# Patient Record
Sex: FEMALE | Race: BLACK OR AFRICAN AMERICAN | URBAN - METROPOLITAN AREA
[De-identification: names, ages, dates, MRNs, and addresses within clinical notes are randomized per-mention and may not be internally consistent; named-entity substitution may affect disease eponyms.]

---

## 2019-08-22 ENCOUNTER — OFFICE VISIT (OUTPATIENT)
Dept: FAMILY MEDICINE CLINIC | Age: 19
End: 2019-08-22
Payer: COMMERCIAL

## 2019-08-22 VITALS
SYSTOLIC BLOOD PRESSURE: 124 MMHG | HEART RATE: 84 BPM | OXYGEN SATURATION: 99 % | DIASTOLIC BLOOD PRESSURE: 82 MMHG | TEMPERATURE: 97.8 F | HEIGHT: 66 IN | RESPIRATION RATE: 12 BRPM | WEIGHT: 178 LBS | BODY MASS INDEX: 28.61 KG/M2

## 2019-08-22 DIAGNOSIS — R31.9 HEMATURIA, UNSPECIFIED TYPE: Primary | ICD-10-CM

## 2019-08-22 DIAGNOSIS — R39.9 UTI SYMPTOMS: ICD-10-CM

## 2019-08-22 LAB
BILIRUBIN, POC: ABNORMAL
BLOOD URINE, POC: ABNORMAL
CLARITY, POC: ABNORMAL
COLOR, POC: ABNORMAL
GLUCOSE URINE, POC: ABNORMAL
KETONES, POC: ABNORMAL
LEUKOCYTE EST, POC: ABNORMAL
NITRITE, POC: ABNORMAL
PH, POC: 7
PROTEIN, POC: ABNORMAL
SPECIFIC GRAVITY, POC: 0.01
UROBILINOGEN, POC: ABNORMAL

## 2019-08-22 PROCEDURE — 1036F TOBACCO NON-USER: CPT | Performed by: NURSE PRACTITIONER

## 2019-08-22 PROCEDURE — 81002 URINALYSIS NONAUTO W/O SCOPE: CPT | Performed by: NURSE PRACTITIONER

## 2019-08-22 PROCEDURE — G8419 CALC BMI OUT NRM PARAM NOF/U: HCPCS | Performed by: NURSE PRACTITIONER

## 2019-08-22 PROCEDURE — 99213 OFFICE O/P EST LOW 20 MIN: CPT | Performed by: NURSE PRACTITIONER

## 2019-08-22 PROCEDURE — G8427 DOCREV CUR MEDS BY ELIG CLIN: HCPCS | Performed by: NURSE PRACTITIONER

## 2019-08-22 RX ORDER — ALBUTEROL SULFATE 90 UG/1
AEROSOL, METERED RESPIRATORY (INHALATION)
COMMUNITY
Start: 2011-10-27

## 2019-08-22 RX ORDER — SULFAMETHOXAZOLE AND TRIMETHOPRIM 800; 160 MG/1; MG/1
1 TABLET ORAL 2 TIMES DAILY
Qty: 14 TABLET | Refills: 0 | Status: SHIPPED | OUTPATIENT
Start: 2019-08-22 | End: 2019-08-29

## 2019-08-22 ASSESSMENT — PATIENT HEALTH QUESTIONNAIRE - PHQ9
1. LITTLE INTEREST OR PLEASURE IN DOING THINGS: 0
SUM OF ALL RESPONSES TO PHQ QUESTIONS 1-9: 0
SUM OF ALL RESPONSES TO PHQ9 QUESTIONS 1 & 2: 0
SUM OF ALL RESPONSES TO PHQ QUESTIONS 1-9: 0
2. FEELING DOWN, DEPRESSED OR HOPELESS: 0

## 2019-08-23 DIAGNOSIS — R31.9 HEMATURIA, UNSPECIFIED TYPE: ICD-10-CM

## 2019-08-23 ASSESSMENT — ENCOUNTER SYMPTOMS
NAUSEA: 0
BACK PAIN: 0
RESPIRATORY NEGATIVE: 1
VOMITING: 0

## 2019-08-25 LAB — URINE CULTURE, ROUTINE: NORMAL

## 2020-09-09 ENCOUNTER — HOSPITAL ENCOUNTER (OUTPATIENT)
Age: 20
Setting detail: SPECIMEN
Discharge: HOME | End: 2020-09-09
Payer: COMMERCIAL

## 2020-09-09 ENCOUNTER — OFFICE VISIT (OUTPATIENT)
Dept: FAMILY MEDICINE CLINIC | Age: 20
End: 2020-09-09
Payer: COMMERCIAL

## 2020-09-09 VITALS
SYSTOLIC BLOOD PRESSURE: 114 MMHG | DIASTOLIC BLOOD PRESSURE: 78 MMHG | HEIGHT: 66 IN | WEIGHT: 180 LBS | HEART RATE: 105 BPM | TEMPERATURE: 98 F | BODY MASS INDEX: 28.93 KG/M2 | OXYGEN SATURATION: 98 % | RESPIRATION RATE: 12 BRPM

## 2020-09-09 LAB
BILIRUBIN, POC: NORMAL
BLOOD URINE, POC: NORMAL
CLARITY, POC: CLEAR
COLOR, POC: YELLOW
GLUCOSE URINE, POC: NORMAL
HCG, URINE, POC: NEGATIVE
KETONES, POC: NORMAL
LEUKOCYTE EST, POC: NORMAL
Lab: NORMAL
NEGATIVE QC PASS/FAIL: NORMAL
NITRITE, POC: NORMAL
PH, POC: 6.5
POSITIVE QC PASS/FAIL: NORMAL
PROTEIN, POC: NORMAL
SPECIFIC GRAVITY, POC: 1
UROBILINOGEN, POC: NORMAL

## 2020-09-09 PROCEDURE — G8419 CALC BMI OUT NRM PARAM NOF/U: HCPCS | Performed by: PHYSICIAN ASSISTANT

## 2020-09-09 PROCEDURE — 87210 SMEAR WET MOUNT SALINE/INK: CPT

## 2020-09-09 PROCEDURE — 1036F TOBACCO NON-USER: CPT | Performed by: PHYSICIAN ASSISTANT

## 2020-09-09 PROCEDURE — 99213 OFFICE O/P EST LOW 20 MIN: CPT | Performed by: PHYSICIAN ASSISTANT

## 2020-09-09 PROCEDURE — 87591 N.GONORRHOEAE DNA AMP PROB: CPT

## 2020-09-09 PROCEDURE — 87808 TRICHOMONAS ASSAY W/OPTIC: CPT

## 2020-09-09 PROCEDURE — 87491 CHLMYD TRACH DNA AMP PROBE: CPT

## 2020-09-09 PROCEDURE — G8427 DOCREV CUR MEDS BY ELIG CLIN: HCPCS | Performed by: PHYSICIAN ASSISTANT

## 2020-09-09 PROCEDURE — 81025 URINE PREGNANCY TEST: CPT | Performed by: PHYSICIAN ASSISTANT

## 2020-09-09 PROCEDURE — 81002 URINALYSIS NONAUTO W/O SCOPE: CPT | Performed by: PHYSICIAN ASSISTANT

## 2020-09-09 ASSESSMENT — PATIENT HEALTH QUESTIONNAIRE - PHQ9
SUM OF ALL RESPONSES TO PHQ QUESTIONS 1-9: 0
SUM OF ALL RESPONSES TO PHQ9 QUESTIONS 1 & 2: 0
2. FEELING DOWN, DEPRESSED OR HOPELESS: 0
SUM OF ALL RESPONSES TO PHQ QUESTIONS 1-9: 0
1. LITTLE INTEREST OR PLEASURE IN DOING THINGS: 0

## 2020-09-09 ASSESSMENT — ENCOUNTER SYMPTOMS
NAUSEA: 0
DIARRHEA: 0
BACK PAIN: 0

## 2020-09-09 NOTE — PATIENT INSTRUCTIONS
sex. Use them from the beginning to the end of sexual contact. · Be sure to tell your sexual partner or partners that you have HPV. Even if you do not have symptoms, you can still pass HPV to others. · Having one sex partner (who does not have STIs and does not have sex with anyone else) is a good way to avoid STIs. When should you call for help? Watch closely for changes in your health, and be sure to contact your doctor if:  · You have vaginal pain during or after sex. · You have vaginal bleeding when you are not in your menstrual period. Where can you learn more? Go to [email or web address].[email or web address]. org and sign in to your [employer] account. Enter F690 in the [employer] box to learn more about \"Human Papillomavirus (HPV): Care Instructions. \"     If you do not have an account, please click on the \"Sign Up Now\" link. Current as of: February 26, 2020               Content Version: 12.5  © 2006-2020 [employer]. Care instructions adapted under license by [facility] [employer] [facility] ([facility]). If you have questions about a medical condition or this instruction, always ask your healthcare professional. [place] [address] any warranty or liability for your use of this information. Patient Education        Genital Warts: Care Instructions  Your Care Instructions  Genital warts are caused by a virus called the human papillomavirus (HPV). They are considered a sexually transmitted infection (STI) because the virus can be spread by sexual contact. The warts often look like small, fleshy bumps or flat, white patches. They can be anywhere in the genital area. You can also be infected with HPV yet not have visible warts. Genital warts often go away on their own without treatment. Some people decide to treat them because of the symptoms or the warts' appearance. Follow-up care is a key part of your treatment and safety.  Be sure to make and go to all appointments, and call your doctor if instruction, always ask your healthcare professional. [place] [address] any warranty or liability for your use of this information.

## 2020-09-09 NOTE — PROGRESS NOTES
Subjective     Elsa Aguayo 23 y.o. female presents 9/9/20 with   Chief Complaint   Patient presents with    Exposure to STD     C/o a lession in the vaginal area. Patient reports she had unprotected sex last month        HPI   Patient is 18yo F with no pertinent PMH who c/o new vaginal skin lesions x 1 week, unchanged. States she had unprotected sexual intercourse with a new male partner about 5 days before the lesions appeared but she does not know if the partner had any symptoms. The lesions are described as a painless cluster of small raised flesh-colored papules near the vaginal opening. She has an IUD for contraception but also took a Plan B the next day. She had a Gardasil vaccine in 2011 and 2017. States she also has a yellow vaginal discharge with slight odor. Denies vaginal bleeding, pain or itching. No treatment so far . Reviewed the following history:    Past Medical History:   Diagnosis Date    Allergic     Asthma     Seasonal allergies      Past Surgical History:   Procedure Laterality Date    ADENOIDECTOMY      PATENT DUCTUS ARTERIOUS LIGATION      TONSILLECTOMY       Family History   Problem Relation Age of Onset    Birth Defects Maternal Grandfather         Prostate cancer       Allergies   Allergen Reactions    Peanuts [Peanut Oil] Swelling    Banana      Throat gets itchy       Current Outpatient Medications   Medication Sig Dispense Refill    albuterol sulfate HFA (VENTOLIN HFA) 108 (90 Base) MCG/ACT inhaler Inhale into the lungs      beclomethasone (QVAR) 80 MCG/ACT inhaler Inhale into the lungs      misoprostol (CYTOTEC) 200 MCG tablet Take 1 tab evening before procedure. Take 1 tab morning of procedure. 2 tablet 0    APRI 0.15-30 MG-MCG per tablet take 1 (ONE) tablet daily  1    ketoconazole (NIZORAL) 2 % cream Apply topically daily for 14 days.  30 g 0    fluticasone (FLONASE) 50 MCG/ACT nasal spray by Nasal route      EPINEPHRINE, ANAPHYLAXIS THERAPY AGENTS, Inject into the muscle      Loratadine 10 MG CAPS Take 1 tablet by mouth daily. Current Facility-Administered Medications   Medication Dose Route Frequency Provider Last Rate Last Dose    levonorgestrel (KULDIP) IUD 13.5 mg 1 each  1 each Intrauterine Once Wyn Labor, APRN - CNP   1 each at 08/13/18 1500       Review of Systems   Constitutional: Negative for chills and fever. Gastrointestinal: Negative for diarrhea and nausea. Genitourinary: Positive for genital sores and vaginal discharge. Negative for dyspareunia, frequency, hematuria, menstrual problem, pelvic pain, vaginal bleeding and vaginal pain. Musculoskeletal: Negative for back pain. All other systems reviewed and are negative. Objective    Vitals:    09/09/20 1336   BP: 114/78   Site: Right Upper Arm   Position: Sitting   Cuff Size: Small Adult   Pulse: 105   Resp: 12   Temp: 98 °F (36.7 °C)   TempSrc: Temporal   SpO2: 98%   Weight: 180 lb (81.6 kg)   Height: 5' 6\" (1.676 m)       Physical Exam  Vitals signs and nursing note reviewed. Constitutional:       General: She is not in acute distress. Appearance: Normal appearance. HENT:      Mouth/Throat:      Pharynx: Oropharynx is clear. No oropharyngeal exudate or posterior oropharyngeal erythema. Eyes:      Conjunctiva/sclera: Conjunctivae normal.   Cardiovascular:      Rate and Rhythm: Normal rate and regular rhythm. Pulmonary:      Effort: Pulmonary effort is normal.      Breath sounds: Normal breath sounds. Genitourinary:      Lymphadenopathy:      Cervical: No cervical adenopathy. Neurological:      Mental Status: She is alert. Assessment and Plan      ICD-10-CM    1. Vaginal discharge  N89.8 POC Pregnancy Ur-Qual     C.trachomatis N.gonorrhoeae DNA, Urine     Wet Prep, Genital     POCT Urinalysis no Micro   2. Genital warts  A63.0    3.  Unprotected sexual intercourse  Z72.51 POC Pregnancy Ur-Qual     C.trachomatis N.gonorrhoeae DNA, Urine Orders Placed This Encounter   Procedures    C.trachomatis N.gonorrhoeae DNA, Urine     Standing Status:   Future     Standing Expiration Date:   9/9/2021    Wet Prep, Genital     Standing Status:   Future     Standing Expiration Date:   9/9/2021    POC Pregnancy Ur-Qual    POCT Urinalysis no Micro     Results for POC orders placed in visit on 09/09/20   POCT Urinalysis no Micro   Result Value Ref Range    Color, UA yellow     Clarity, UA clear     Glucose, UA POC neg     Bilirubin, UA neg     Ketones, UA neg     Spec Grav, UA 1.005     Blood, UA POC neg     pH, UA 6.5     Protein, UA POC neg     Urobilinogen, UA neg     Leukocytes, UA neg     Nitrite, UA neg      I will call with results. No orders of the defined types were placed in this encounter. Reviewed with the patient: current clinicalstatus, medications, activities and diet. Side effects, adverse effects of the medication prescribed today, as well as treatment plan and result expectations have been discussed with the patient who expressesunderstanding and desires to proceed. Close follow up to evaluate treatment results and for coordination of care. I have reviewed the patient's medical history in detail and updated the computerized patientrecord. Return if symptoms worsen or fail to improve, for follow up with PCP.     MARIA DOLORES Jacinto

## 2020-09-10 LAB
CLUE CELLS: NORMAL
TRICHOMONAS PREP: NORMAL
TRICHOMONAS VAGINALIS SCREEN: NEGATIVE
YEAST WET PREP: NORMAL

## 2020-09-15 LAB
C. TRACHOMATIS DNA ,URINE: NEGATIVE
N. GONORRHOEAE DNA, URINE: NEGATIVE

## 2020-09-16 ENCOUNTER — TELEPHONE (OUTPATIENT)
Dept: FAMILY MEDICINE CLINIC | Age: 20
End: 2020-09-16

## 2022-01-26 ENCOUNTER — HOSPITAL ENCOUNTER (OUTPATIENT)
Dept: CT IMAGING | Age: 22
Discharge: HOME | End: 2022-01-28
Payer: COMMERCIAL

## 2022-01-26 ENCOUNTER — OFFICE VISIT (OUTPATIENT)
Dept: FAMILY MEDICINE CLINIC | Age: 22
End: 2022-01-26
Payer: COMMERCIAL

## 2022-01-26 VITALS
BODY MASS INDEX: 23.3 KG/M2 | TEMPERATURE: 97.1 F | DIASTOLIC BLOOD PRESSURE: 78 MMHG | HEIGHT: 66 IN | SYSTOLIC BLOOD PRESSURE: 120 MMHG | WEIGHT: 145 LBS | OXYGEN SATURATION: 100 % | HEART RATE: 108 BPM | RESPIRATION RATE: 11 BRPM

## 2022-01-26 DIAGNOSIS — R59.1 LYMPHADENOPATHY: ICD-10-CM

## 2022-01-26 DIAGNOSIS — R59.1 LYMPHADENOPATHY: Primary | ICD-10-CM

## 2022-01-26 DIAGNOSIS — J02.9 ACUTE PHARYNGITIS, UNSPECIFIED ETIOLOGY: ICD-10-CM

## 2022-01-26 LAB
CONTROL: NORMAL
INFLUENZA A ANTIBODY: NORMAL
INFLUENZA B ANTIBODY: NORMAL
Lab: NORMAL
PERFORMING INSTRUMENT: NORMAL
PREGNANCY TEST URINE, POC: NEGATIVE
QC PASS/FAIL: NORMAL
S PYO AG THROAT QL: NORMAL
SARS-COV-2, POC: NORMAL

## 2022-01-26 PROCEDURE — G8484 FLU IMMUNIZE NO ADMIN: HCPCS | Performed by: PHYSICIAN ASSISTANT

## 2022-01-26 PROCEDURE — 87880 STREP A ASSAY W/OPTIC: CPT | Performed by: PHYSICIAN ASSISTANT

## 2022-01-26 PROCEDURE — 87804 INFLUENZA ASSAY W/OPTIC: CPT | Performed by: PHYSICIAN ASSISTANT

## 2022-01-26 PROCEDURE — 70491 CT SOFT TISSUE NECK W/DYE: CPT

## 2022-01-26 PROCEDURE — 99213 OFFICE O/P EST LOW 20 MIN: CPT | Performed by: PHYSICIAN ASSISTANT

## 2022-01-26 PROCEDURE — 87426 SARSCOV CORONAVIRUS AG IA: CPT | Performed by: PHYSICIAN ASSISTANT

## 2022-01-26 PROCEDURE — G8420 CALC BMI NORM PARAMETERS: HCPCS | Performed by: PHYSICIAN ASSISTANT

## 2022-01-26 PROCEDURE — G8427 DOCREV CUR MEDS BY ELIG CLIN: HCPCS | Performed by: PHYSICIAN ASSISTANT

## 2022-01-26 PROCEDURE — 1036F TOBACCO NON-USER: CPT | Performed by: PHYSICIAN ASSISTANT

## 2022-01-26 PROCEDURE — 81025 URINE PREGNANCY TEST: CPT | Performed by: PHYSICIAN ASSISTANT

## 2022-01-26 PROCEDURE — 86308 HETEROPHILE ANTIBODY SCREEN: CPT | Performed by: PHYSICIAN ASSISTANT

## 2022-01-26 PROCEDURE — 6360000004 HC RX CONTRAST MEDICATION: Performed by: PHYSICIAN ASSISTANT

## 2022-01-26 RX ORDER — AMOXICILLIN AND CLAVULANATE POTASSIUM 875; 125 MG/1; MG/1
1 TABLET, FILM COATED ORAL 2 TIMES DAILY
Qty: 20 TABLET | Refills: 0 | Status: SHIPPED | OUTPATIENT
Start: 2022-01-26 | End: 2022-02-05

## 2022-01-26 RX ADMIN — IOPAMIDOL 100 ML: 755 INJECTION, SOLUTION INTRAVENOUS at 18:02

## 2022-01-26 ASSESSMENT — ENCOUNTER SYMPTOMS
SORE THROAT: 1
DIARRHEA: 0
NAUSEA: 1
TROUBLE SWALLOWING: 1
VOMITING: 0

## 2022-01-26 NOTE — PROGRESS NOTES
Subjective:      Patient ID: Vinicio Bates is a 24 y.o. female who presents today for:  Chief Complaint   Patient presents with    Pharyngitis     pt states this started 2-3 days ago .  Other     pt has swollen lymp nodes . Began to have a sore throat about 2-3 days ago and noticed extreme, tender swelling to L side of her neck and worsening throat pain. Unsure whether she had a fever PTA but is tachycardic and afebrile in clinic today. Pt has had increasingly severe HAs,+ nausea no vomiting  No subjective FC, diarrhea, coughing, SOB, wheezing. Pt is sexually active, denies any other lymphadenopathy. Past Medical History:   Diagnosis Date    Allergic     Asthma     High cholesterol     Seasonal allergies      Past Surgical History:   Procedure Laterality Date    ADENOIDECTOMY      PATENT DUCTUS ARTERIOUS LIGATION      TONSILLECTOMY       Family History   Problem Relation Age of Onset    Birth Defects Maternal Grandfather         Prostate cancer     Social History     Socioeconomic History    Marital status: Single     Spouse name: Not on file    Number of children: Not on file    Years of education: Not on file    Highest education level: Not on file   Occupational History    Not on file   Tobacco Use    Smoking status: Never Smoker    Smokeless tobacco: Never Used   Vaping Use    Vaping Use: Never used   Substance and Sexual Activity    Alcohol use: No    Drug use: No    Sexual activity: Yes     Partners: Male     Birth control/protection: Condom   Other Topics Concern    Not on file   Social History Narrative    Not on file     Social Determinants of Health     Financial Resource Strain:     Difficulty of Paying Living Expenses: Not on file   Food Insecurity:     Worried About Running Out of Food in the Last Year: Not on file    Deanna of Food in the Last Year: Not on file   Transportation Needs:     Lack of Transportation (Medical):  Not on file    Lack of Transportation (Non-Medical): Not on file   Physical Activity:     Days of Exercise per Week: Not on file    Minutes of Exercise per Session: Not on file   Stress:     Feeling of Stress : Not on file   Social Connections:     Frequency of Communication with Friends and Family: Not on file    Frequency of Social Gatherings with Friends and Family: Not on file    Attends Confucianist Services: Not on file    Active Member of [address] [place] [address] or Organizations: Not on file    Attends Club or Organization Meetings: Not on file    Marital Status: Not on file   Intimate Partner Violence:     Fear of Current or Ex-Partner: Not on file    Emotionally Abused: Not on file    Physically Abused: Not on file    Sexually Abused: Not on file   Housing Stability:     Unable to Pay for Housing in the Last Year: Not on file    Number of [place] in the Last Year: Not on file    Unstable Housing in the Last Year: Not on file     Current Outpatient Medications on File Prior to Visit   Medication Sig Dispense Refill    Drospirenone-Estetrol (NEXTSTELLIS) 3-14.2 MG TABS Take 1 tablet by mouth daily 84 tablet 3    albuterol sulfate HFA (VENTOLIN HFA) 108 (90 Base) MCG/ACT inhaler Inhale into the lungs (Patient not taking: Reported on 9/1/2021)      beclomethasone (QVAR) 80 MCG/ACT inhaler Inhale into the lungs (Patient not taking: Reported on 9/1/2021)      misoprostol (CYTOTEC) 200 MCG tablet Take 1 tab evening before procedure. Take 1 tab morning of procedure. (Patient not taking: Reported on 9/1/2021) 2 tablet 0    APRI 0.15-30 MG-MCG per tablet take 1 (ONE) tablet daily (Patient not taking: Reported on 9/1/2021)  1    ketoconazole (NIZORAL) 2 % cream Apply topically daily for 14 days.  (Patient not taking: Reported on 9/1/2021) 30 g 0    fluticasone (FLONASE) 50 MCG/ACT nasal spray by Nasal route (Patient not taking: Reported on 9/1/2021)      EPINEPHRINE, ANAPHYLAXIS THERAPY AGENTS, Inject into the muscle      Loratadine 10 MG CAPS Take 1 tablet by mouth daily. (Patient not taking: Reported on 9/1/2021)       No current facility-administered medications on file prior to visit. Peanuts [peanut oil] and Banana    Review of Systems   Constitutional: Negative for fever. HENT: Positive for sore throat and trouble swallowing. Negative for congestion. Gastrointestinal: Positive for nausea. Negative for diarrhea and vomiting. All other systems reviewed and are negative. Objective:   /78   Pulse 108   Temp 97.1 °F (36.2 °C) (Temporal)   Resp 11   Ht 5' 6\" (1.676 m)   Wt 145 lb (65.8 kg)   SpO2 100%   BMI 23.40 kg/m²     Physical Exam  Vitals and nursing note reviewed. Exam conducted with a chaperone present. Constitutional:       General: She is not in acute distress. Appearance: Normal appearance. She is not ill-appearing. HENT:      Head: Normocephalic and atraumatic. Right Ear: Tympanic membrane normal.      Left Ear: Tympanic membrane normal.      Nose: Nose normal. No congestion or rhinorrhea. Mouth/Throat:      Mouth: Mucous membranes are moist.      Pharynx: Posterior oropharyngeal erythema present. No oropharyngeal exudate. Comments: Severe asymmetric swelling to L side of post oropharynx, airway patent although narrowed  Pt has difficulty extending her tongue 2/2 pain    Eyes:      General: No scleral icterus. Extraocular Movements: Extraocular movements intact. Conjunctiva/sclera: Conjunctivae normal.      Pupils: Pupils are equal, round, and reactive to light. Neck:      Comments: Mass like swelling to L side of neck, TTP  Cardiovascular:      Rate and Rhythm: Normal rate and regular rhythm. Pulses: Normal pulses. Heart sounds: Normal heart sounds. Pulmonary:      Effort: Pulmonary effort is normal.      Breath sounds: Normal breath sounds. Abdominal:      General: Abdomen is flat. Palpations: Abdomen is soft. Tenderness:  There is no abdominal tenderness. Musculoskeletal:         General: Normal range of motion. Cervical back: Normal range of motion. Skin:     General: Skin is warm and dry. Neurological:      General: No focal deficit present. Mental Status: She is alert and oriented to person, place, and time. Cranial Nerves: No cranial nerve deficit. Psychiatric:         Mood and Affect: Mood normal.         Behavior: Behavior normal.         Thought Content: Thought content normal.         Judgment: Judgment normal.         Assessment:      DiffDx includes:  Mononucleosis, LDA associated w/acute/strep pharyngitis, tonsillitis, tonsillar abscess, sialolith, mass (lymphoma). Diagnosis Orders   1. Lymphadenopathy  POCT rapid strep A    Culture, Throat    POCT COVID-19, Antigen    POCT Influenza A/B    POCT urine pregnancy    CT SOFT TISSUE NECK W WO CONTRAST    POCT Infectious mononucleosis Abs (mono)    amoxicillin-clavulanate (AUGMENTIN) 875-125 MG per tablet   2. Acute pharyngitis, unspecified etiology  POCT rapid strep A    Culture, Throat    POCT COVID-19, Antigen    POCT Influenza A/B    POCT urine pregnancy    CT SOFT TISSUE NECK W WO CONTRAST    amoxicillin-clavulanate (AUGMENTIN) 875-125 MG per tablet       Plan:      Orders Placed This Encounter   Procedures    Culture, Throat     Standing Status:   Future     Number of Occurrences:   1     Standing Expiration Date:   1/26/2023    CT SOFT TISSUE NECK W WO CONTRAST     Order Specific Question:   Reason for exam:     Answer:   mass like swelling of neck    POCT rapid strep A    POCT COVID-19, Antigen     Order Specific Question:   Is this test for diagnosis or screening? Answer:   Diagnosis of ill patient     Order Specific Question:   Symptomatic for COVID-19 as defined by CDC? Answer:   Yes     Order Specific Question:   Date of Symptom Onset     Answer:   1/23/2022     Order Specific Question:   Hospitalized for COVID-19?      Answer:   No     Order

## 2022-01-27 ENCOUNTER — NURSE ONLY (OUTPATIENT)
Dept: FAMILY MEDICINE CLINIC | Age: 22
End: 2022-01-27
Payer: COMMERCIAL

## 2022-01-27 DIAGNOSIS — J02.9 ACUTE PHARYNGITIS, UNSPECIFIED ETIOLOGY: Primary | ICD-10-CM

## 2022-01-27 LAB
HETEROPHILE ANTIBODIES: NEGATIVE
HETEROPHILE ANTIBODIES: NORMAL

## 2022-01-27 PROCEDURE — 86308 HETEROPHILE ANTIBODY SCREEN: CPT | Performed by: PHYSICIAN ASSISTANT

## 2022-01-28 ENCOUNTER — HOSPITAL ENCOUNTER (OUTPATIENT)
Age: 22
Setting detail: SPECIMEN
Discharge: HOME | End: 2022-01-28
Payer: COMMERCIAL

## 2022-01-28 DIAGNOSIS — R59.1 LYMPHADENOPATHY: ICD-10-CM

## 2022-01-28 DIAGNOSIS — J02.9 ACUTE PHARYNGITIS, UNSPECIFIED ETIOLOGY: ICD-10-CM

## 2022-01-28 PROCEDURE — 87070 CULTURE OTHR SPECIMN AEROBIC: CPT

## 2022-01-31 LAB — THROAT CULTURE: NORMAL

## 2023-03-04 ENCOUNTER — OFFICE VISIT (OUTPATIENT)
Dept: FAMILY MEDICINE CLINIC | Age: 23
End: 2023-03-04
Payer: COMMERCIAL

## 2023-03-04 VITALS — HEART RATE: 101 BPM | OXYGEN SATURATION: 99 % | TEMPERATURE: 99 F | WEIGHT: 163 LBS | BODY MASS INDEX: 27.12 KG/M2

## 2023-03-04 DIAGNOSIS — Z20.822 EXPOSURE TO CONFIRMED CASE OF COVID-19: ICD-10-CM

## 2023-03-04 DIAGNOSIS — U07.1 COVID-19: Primary | ICD-10-CM

## 2023-03-04 DIAGNOSIS — R53.83 FATIGUE, UNSPECIFIED TYPE: ICD-10-CM

## 2023-03-04 DIAGNOSIS — J45.20 INTERMITTENT ASTHMA WITHOUT COMPLICATION, UNSPECIFIED ASTHMA SEVERITY: ICD-10-CM

## 2023-03-04 LAB
Lab: ABNORMAL
PERFORMING INSTRUMENT: ABNORMAL
QC PASS/FAIL: ABNORMAL
SARS-COV-2, POC: DETECTED

## 2023-03-04 PROCEDURE — G8427 DOCREV CUR MEDS BY ELIG CLIN: HCPCS | Performed by: NURSE PRACTITIONER

## 2023-03-04 PROCEDURE — 99213 OFFICE O/P EST LOW 20 MIN: CPT | Performed by: NURSE PRACTITIONER

## 2023-03-04 PROCEDURE — G8419 CALC BMI OUT NRM PARAM NOF/U: HCPCS | Performed by: NURSE PRACTITIONER

## 2023-03-04 PROCEDURE — 87426 SARSCOV CORONAVIRUS AG IA: CPT | Performed by: NURSE PRACTITIONER

## 2023-03-04 PROCEDURE — 1036F TOBACCO NON-USER: CPT | Performed by: NURSE PRACTITIONER

## 2023-03-04 PROCEDURE — G8484 FLU IMMUNIZE NO ADMIN: HCPCS | Performed by: NURSE PRACTITIONER

## 2023-03-04 RX ORDER — ALBUTEROL SULFATE 90 UG/1
2 AEROSOL, METERED RESPIRATORY (INHALATION) EVERY 6 HOURS PRN
Qty: 18 G | Refills: 3 | Status: SHIPPED | OUTPATIENT
Start: 2023-03-04

## 2023-03-04 SDOH — ECONOMIC STABILITY: HOUSING INSECURITY
IN THE LAST 12 MONTHS, WAS THERE A TIME WHEN YOU DID NOT HAVE A STEADY PLACE TO SLEEP OR SLEPT IN A SHELTER (INCLUDING NOW)?: NO

## 2023-03-04 SDOH — ECONOMIC STABILITY: INCOME INSECURITY: HOW HARD IS IT FOR YOU TO PAY FOR THE VERY BASICS LIKE FOOD, HOUSING, MEDICAL CARE, AND HEATING?: NOT HARD AT ALL

## 2023-03-04 SDOH — ECONOMIC STABILITY: FOOD INSECURITY: WITHIN THE PAST 12 MONTHS, YOU WORRIED THAT YOUR FOOD WOULD RUN OUT BEFORE YOU GOT MONEY TO BUY MORE.: NEVER TRUE

## 2023-03-04 SDOH — ECONOMIC STABILITY: FOOD INSECURITY: WITHIN THE PAST 12 MONTHS, THE FOOD YOU BOUGHT JUST DIDN'T LAST AND YOU DIDN'T HAVE MONEY TO GET MORE.: NEVER TRUE

## 2023-03-04 ASSESSMENT — PATIENT HEALTH QUESTIONNAIRE - PHQ9
SUM OF ALL RESPONSES TO PHQ QUESTIONS 1-9: 0
SUM OF ALL RESPONSES TO PHQ QUESTIONS 1-9: 0
1. LITTLE INTEREST OR PLEASURE IN DOING THINGS: 0
SUM OF ALL RESPONSES TO PHQ9 QUESTIONS 1 & 2: 0
SUM OF ALL RESPONSES TO PHQ QUESTIONS 1-9: 0
2. FEELING DOWN, DEPRESSED OR HOPELESS: 0
SUM OF ALL RESPONSES TO PHQ QUESTIONS 1-9: 0

## 2023-03-04 ASSESSMENT — ENCOUNTER SYMPTOMS
SORE THROAT: 1
NAUSEA: 0
VOICE CHANGE: 0
WHEEZING: 0
SHORTNESS OF BREATH: 1
COUGH: 0
CONSTIPATION: 1
TROUBLE SWALLOWING: 0
ABDOMINAL PAIN: 0
CHEST TIGHTNESS: 0
DIARRHEA: 0
RHINORRHEA: 1

## 2023-03-04 NOTE — PROGRESS NOTES
Subjective  Uvaldealana Interiano, 20 y.o. female presents today with:  Chief Complaint   Patient presents with    Other     Tested positive for covid today   Sx x 2 days        HPI  P[facility] to [facility] for [facility]19 [facility]   Symptoms over the last 2 days   Tested positive at home this morning for COVID-19  Multiple other members of household currently with COVID-19  C/o nasal congestion/drainage, sore throat, headache and feeling lightheaded   Fatigued   Denies cough   Denies chest tightness   C/o mild exertional SOB at times   Denies chest pain   Denies GI abnormalities   Eating and drinking well   Sleep uninterrupted   Has zyrtec. Taking Tylenol prn   Off Qvar for years                 Past Medical History:   Diagnosis Date    Allergic     Asthma     High cholesterol     Seasonal allergies       Past Surgical History:   Procedure Laterality Date    ADENOIDECTOMY      PATENT DUCTUS ARTERIOUS LIGATION      TONSILLECTOMY       Family History   Adopted: Yes   Problem Relation Age of Onset    Birth Defects Maternal Grandfather         Prostate cancer             Review of Systems   Constitutional:  Positive for fatigue. Negative for activity change, appetite change, chills and diaphoresis. Fever: unsure. not taking temp. HENT:  Positive for congestion, rhinorrhea and sore throat. Negative for ear pain, trouble swallowing and voice change. Respiratory:  Positive for shortness of breath (exertional). Negative for cough, chest tightness and wheezing. Cardiovascular:  Negative for chest pain and palpitations. Gastrointestinal:  Positive for constipation. Negative for abdominal pain, diarrhea and nausea. Musculoskeletal:  Negative for arthralgias and myalgias. Skin:  Negative for rash. Neurological:  Positive for light-headedness and headaches. Negative for dizziness. Psychiatric/Behavioral:  Negative for sleep disturbance.         PMH, Surgical Hx, Family Hx, and Social Hx reviewed and updated. Objective  Vitals:    03/04/23 1046   Pulse: (!) 101   Temp: 99 °F (37.2 °C)   SpO2: 99%   Weight: 163 lb (73.9 kg)     BP Readings from Last 3 Encounters:   10/05/22 (!) 150/94   06/07/22 (!) 150/87   01/26/22 120/78     Wt Readings from Last 3 Encounters:   03/04/23 163 lb (73.9 kg)   06/07/22 163 lb 8 oz (74.2 kg)   01/26/22 145 lb (65.8 kg)             Physical Exam  Vitals reviewed. Constitutional:       General: She is not in acute distress. Appearance: Normal appearance. She is not toxic-appearing. HENT:      Right Ear: There is impacted cerumen. Left Ear: Tympanic membrane, ear canal and external ear normal.      Nose: Congestion present. Mouth/Throat:      Lips: Pink. Mouth: Mucous membranes are moist.      Pharynx: Oropharynx is clear. Uvula midline. No pharyngeal swelling, oropharyngeal exudate, posterior oropharyngeal erythema or uvula swelling. Eyes:      General: Lids are normal. Vision grossly intact. Conjunctiva/sclera: Conjunctivae normal.   Cardiovascular:      Rate and Rhythm: Normal rate and regular rhythm. Heart sounds: Normal heart sounds. Pulmonary:      Effort: Pulmonary effort is normal.      Breath sounds: Normal breath sounds and air entry. Musculoskeletal:         General: Normal range of motion. Cervical back: Normal range of motion. No rigidity. Lymphadenopathy:      Head:      Right side of head: No submental, submandibular, tonsillar, preauricular or posterior auricular adenopathy. Left side of head: No submental, submandibular, tonsillar, preauricular or posterior auricular adenopathy. Cervical: No cervical adenopathy. Skin:     General: Skin is warm and dry. Capillary Refill: Capillary refill takes less than 2 seconds. Coloration: Skin is not pale. Neurological:      General: No focal deficit present. Mental Status: She is alert and oriented to person, place, and time. Assessment & Plan    Diagnosis Orders   1. COVID-19        2. Exposure to confirmed case of COVID-19  POCT COVID-19, Antigen      3. Fatigue, unspecified type  POCT COVID-19, Antigen      4. Intermittent asthma without complication, unspecified asthma severity  albuterol sulfate HFA (PROVENTIL;VENTOLIN;PROAIR) 108 (90 Base) MCG/ACT inhaler        Orders Placed This Encounter   Procedures    POCT COVID-19, Antigen     Orders Placed This Encounter   Medications    albuterol sulfate HFA (PROVENTIL;VENTOLIN;PROAIR) 108 (90 Base) MCG/ACT inhaler     Sig: Inhale 2 puffs into the lungs every 6 hours as needed for Wheezing or Shortness of Breath     Dispense:  18 g     Refill:  3             If you experience any of the red flag s/s, seek care at the ER  Trouble breathing  Persistent chest pain  Confusion  Extreme fatigue  Dehydration         Reviewed with the patient: current clinical status. Discussed with patient COVID-19 s/s. Pt aware to remain on home isolation based on today's test result. Pt instructed on red flag s/s to go to the ER for or to call 911. Pt verbalized understanding. Treatment includes supportive care with rest, hydration, and medications for symptom management as ordered/discussed. This is a contagious illness which is transmitted through the air. Make sure to cover your cough and practice good hand hygiene. When to call for help  Call 911 anytime you think you may need emergency care. For example, call if:  You have severe trouble breathing. You have severe dehydration. Close follow up to evaluate treatment results and for coordination of care. I have reviewed the patient's medical history in detail and updated the computerized patient record.       MADHAVI Trujillo - CASSIA

## 2023-03-04 NOTE — LETTER
March 4, 2023       Kimberley Elliott YOB: 2000   [address]  [place] [address] Date of Visit:  3/4/2023       To Whom It May Concern: It is my medical opinion that Magalys Brito should remain out of work until 3/9/23. Please excuse her from work 3/7-3/8/23 for home isolation for COVID-19. To return to work 3/9-3/10 she should wear a tight-fitting mask. If you have any questions or concerns, please don't hesitate to call.         Sincerely,              MADHAVI Beltran NP

## 2023-03-06 ASSESSMENT — VISUAL ACUITY: OU: 1

## 2023-10-06 ENCOUNTER — OFFICE VISIT (OUTPATIENT)
Dept: PRIMARY CARE | Facility: CLINIC | Age: 23
End: 2023-10-06
Payer: COMMERCIAL

## 2023-10-06 VITALS
SYSTOLIC BLOOD PRESSURE: 125 MMHG | HEIGHT: 66 IN | WEIGHT: 189.2 LBS | HEART RATE: 88 BPM | DIASTOLIC BLOOD PRESSURE: 75 MMHG | BODY MASS INDEX: 30.41 KG/M2 | TEMPERATURE: 97.9 F

## 2023-10-06 DIAGNOSIS — R21 SKIN RASH: Primary | ICD-10-CM

## 2023-10-06 DIAGNOSIS — F20.81 SCHIZOPHRENIFORM DISORDER (MULTI): ICD-10-CM

## 2023-10-06 DIAGNOSIS — Z91.018 FOOD ALLERGY: ICD-10-CM

## 2023-10-06 PROCEDURE — 99214 OFFICE O/P EST MOD 30 MIN: CPT | Performed by: INTERNAL MEDICINE

## 2023-10-06 PROCEDURE — 96372 THER/PROPH/DIAG INJ SC/IM: CPT | Performed by: INTERNAL MEDICINE

## 2023-10-06 PROCEDURE — 3008F BODY MASS INDEX DOCD: CPT | Performed by: INTERNAL MEDICINE

## 2023-10-06 RX ORDER — METHYLPREDNISOLONE 4 MG/1
TABLET ORAL
Qty: 21 TABLET | Refills: 0 | Status: SHIPPED | OUTPATIENT
Start: 2023-10-06 | End: 2023-10-20 | Stop reason: ALTCHOICE

## 2023-10-06 RX ORDER — ALBUTEROL SULFATE 90 UG/1
2 AEROSOL, METERED RESPIRATORY (INHALATION) EVERY 6 HOURS PRN
COMMUNITY
Start: 2023-03-04

## 2023-10-06 RX ORDER — METHYLPREDNISOLONE ACETATE 40 MG/ML
40 INJECTION, SUSPENSION INTRA-ARTICULAR; INTRALESIONAL; INTRAMUSCULAR; SOFT TISSUE ONCE
Status: COMPLETED | OUTPATIENT
Start: 2023-10-06 | End: 2023-10-06

## 2023-10-06 RX ORDER — EPINEPHRINE 0.3 MG/.3ML
0.3 INJECTION SUBCUTANEOUS ONCE AS NEEDED
COMMUNITY
Start: 2023-05-02

## 2023-10-06 RX ORDER — FLUTICASONE PROPIONATE 50 MCG
1 SPRAY, SUSPENSION (ML) NASAL DAILY
COMMUNITY
Start: 2011-10-27

## 2023-10-06 RX ADMIN — METHYLPREDNISOLONE ACETATE 40 MG: 40 INJECTION, SUSPENSION INTRA-ARTICULAR; INTRALESIONAL; INTRAMUSCULAR; SOFT TISSUE at 11:54

## 2023-10-06 ASSESSMENT — PAIN SCALES - GENERAL: PAINLEVEL: 2

## 2023-10-06 NOTE — PROGRESS NOTES
Subjective   Patient ID: Sheila Pollock is a 23 y.o. female who presents for Allergic Reaction (Painful hives).    [facility]  Pt is a pleasant 23 y.o. AAF who was seen and evaluated with the hx of itchy rash on the right side of abdomen for about a week or so. Pt states that she has allergic reactions to the many types of food. Pt states that she did not eat something unusual recently. Pt also did not change the skin routine, no new laundry detergent. Pt did not have any exposure to poison ivy. Pt denies usage of new meds/OTC drugs, vitamins/supplements. During the clinical encounter pt denies fever, chills, no SOB, no chest pain/tightness, no abdominal pain, no N/V/D reported, no change of urination reported. Pt denies any lips/tongue swelling, no difficulties to swallow food. Pt denies any other health concerns during this visit.  Sohx: reviewed  PMHx and Fhx: reviewed      Review of Systems  Constitutional: no fever, no chills  Eyes: no eyesight problems, no eye redness, no eye pain, no blurred vision  ENT: no ear pain or sore throat, no nasal discharge or congestion, no sinus pressure, no hearing loss  Cardiovascular: no chest pain or tightness, no SOB, the heart rate was not fast or slow  Respiratory: no cough, no dyspnea with exertion or with rest, no wheezing  Gastrointestinal: no abdominal pain, no constipation or diarrhea, no heartburn, no nausea or vomiting  Genitourinary: no urine frequency, no dysuria, no urinary urgency, no urinary incontinence or retention  Musculoskeletal: no back pain, no arthralgia, no joint swelling or stiffness, no muscle weakness  Integumentary: as mentioned at [facility]  Neurological: no headaches, no dizziness or fainting, no limb weakness  Psychiatric: no mood changes, no anxiety or depression, no suicidal thoughts  Endocrine: no weight change, no temperature intolerance, no change of appetite  Hematologic/Lymphatic: no easy bruising or bleeding    Objective   /75 (BP Location:  "Right arm)   Pulse 88   Temp 36.6 °C (97.9 °F)   Ht 1.676 m (5' 6\")   Wt 85.8 kg (189 lb 3.2 oz)   BMI 30.54 kg/m²     Physical Exam  Constitutional: well hydrated, well nourished, no acute distress. Vital signs reviewed  Head and face: normocephalic, atraumatic  Eyes: no erythema, edema or visible abnormalities of conjunctiva or lids. Pupils are equal, round and reactive to light. Extraocular movement normal  ENT: external ears without deformities, external ear canals without redness, swelling or tenderness. TMs normal color, normal landmarks, no fluid, non-retracted  Neck: full ROM, no adenopathy, neck was supple, thyroid gland not enlarged, no palpable nodules  Pulmonary: normal respiratory rate and effort. Lungs are clear to auscultation, no rales,no rhonchi or wheezing  Cardiovascular: regular rate and rhythm, normal S1 and S2, no murmur, no gallop, posterior tib. pulse normal 2+ bilaterally, no lower extremity edema  Abdomen: soft, non tender on palpation, not distended, normal BS in all 4 quadrants, no CVA tenderness  Musculoskeletal: no joint swelling, normal movements of all 4 extremities. Gait and station: normal, Digits and nails: normal without clubbing  Skin: Integumentary: the skin exam showed the mildly erythematous area of well-demarcated eruption localized at Right Upper Quadrant of Abdomen about 2x14cm in size, no pain/fluctuation on palpation  Neurologic: Cranial nerves: CN II: visual acuity intact. Source: acuity reported by patient. Pupils reactive to light with normal accommodation. Right and left pupil normal CN III, IV and VI: the EO movements were intact. CN VIII: no hearing loss. Sensory exam: normal light to touch,  Psychiatric: Mood and affect: normal, Alert and Oriented x 3  Lymphatic: no cervical lymphadenopathy    Assessment/Plan   Skin rash:  HX and PE as mentioned  Will provide the methylprednisolone IM inj once during the OC. Pt denies any chance of pregnancy  Pt will start on " medrol dose pack for the next 6 day  Pt ws educated about the sxs of the skin infection and instructed to contact PCP immediately if she will have any  Pt has food allergy and hx of hive. Will place a referral for the allergist evaluation  Pt was instructed to contact PCP if pt will have no improvement of the condition/worsening of the sxs/new sxs on the current treatment  Plan was d/c with the pt in details, verbalized understanding, agreed    BMI of 30, Obesity WHO class I: pt will be beneficial form the lifestyle modifications  Please follow up with PCP as planned or sooner if needed

## 2023-10-18 PROBLEM — F41.9 ANXIETY DISORDER: Status: ACTIVE | Noted: 2023-10-18

## 2023-10-18 PROBLEM — E55.9 VITAMIN D DEFICIENCY: Status: ACTIVE | Noted: 2023-10-18

## 2023-10-20 ENCOUNTER — OFFICE VISIT (OUTPATIENT)
Dept: PRIMARY CARE | Facility: CLINIC | Age: 23
End: 2023-10-20
Payer: COMMERCIAL

## 2023-10-20 VITALS
SYSTOLIC BLOOD PRESSURE: 132 MMHG | DIASTOLIC BLOOD PRESSURE: 87 MMHG | OXYGEN SATURATION: 98 % | RESPIRATION RATE: 16 BRPM | TEMPERATURE: 97.6 F | BODY MASS INDEX: 29.38 KG/M2 | HEIGHT: 67 IN | HEART RATE: 99 BPM | WEIGHT: 187.2 LBS

## 2023-10-20 DIAGNOSIS — Z23 NEED FOR TDAP VACCINATION: ICD-10-CM

## 2023-10-20 DIAGNOSIS — L23.9 ALLERGIC DERMATITIS: ICD-10-CM

## 2023-10-20 DIAGNOSIS — E78.5 HYPERLIPIDEMIA, UNSPECIFIED HYPERLIPIDEMIA TYPE: ICD-10-CM

## 2023-10-20 DIAGNOSIS — Z23 IMMUNIZATION DUE: ICD-10-CM

## 2023-10-20 DIAGNOSIS — Z00.00 ANNUAL PHYSICAL EXAM: Primary | ICD-10-CM

## 2023-10-20 DIAGNOSIS — R39.9 UTI SYMPTOMS: ICD-10-CM

## 2023-10-20 LAB
POC APPEARANCE, URINE: CLEAR
POC BILIRUBIN, URINE: NEGATIVE
POC BLOOD, URINE: NEGATIVE
POC COLOR, URINE: YELLOW
POC GLUCOSE, URINE: NEGATIVE MG/DL
POC KETONES, URINE: NEGATIVE MG/DL
POC LEUKOCYTES, URINE: NEGATIVE
POC NITRITE,URINE: NEGATIVE
POC PH, URINE: 6 PH
POC PROTEIN, URINE: NEGATIVE MG/DL
POC SPECIFIC GRAVITY, URINE: 1.02
POC UROBILINOGEN, URINE: 0.2 EU/DL

## 2023-10-20 PROCEDURE — 90471 IMMUNIZATION ADMIN: CPT | Performed by: PHYSICIAN ASSISTANT

## 2023-10-20 PROCEDURE — 91322 SARSCOV2 VAC 50 MCG/0.5ML IM: CPT | Performed by: PHYSICIAN ASSISTANT

## 2023-10-20 PROCEDURE — 99395 PREV VISIT EST AGE 18-39: CPT | Performed by: PHYSICIAN ASSISTANT

## 2023-10-20 PROCEDURE — 90715 TDAP VACCINE 7 YRS/> IM: CPT | Performed by: PHYSICIAN ASSISTANT

## 2023-10-20 PROCEDURE — 3008F BODY MASS INDEX DOCD: CPT | Performed by: PHYSICIAN ASSISTANT

## 2023-10-20 PROCEDURE — 90480 ADMN SARSCOV2 VAC 1/ONLY CMP: CPT | Performed by: PHYSICIAN ASSISTANT

## 2023-10-20 PROCEDURE — 1036F TOBACCO NON-USER: CPT | Performed by: PHYSICIAN ASSISTANT

## 2023-10-20 PROCEDURE — 81003 URINALYSIS AUTO W/O SCOPE: CPT | Performed by: PHYSICIAN ASSISTANT

## 2023-10-20 PROCEDURE — 87086 URINE CULTURE/COLONY COUNT: CPT

## 2023-10-20 RX ORDER — CLOBETASOL PROPIONATE 0.5 MG/G
CREAM TOPICAL 2 TIMES DAILY
Qty: 60 G | Refills: 0 | Status: SHIPPED | OUTPATIENT
Start: 2023-10-20 | End: 2024-02-05 | Stop reason: HOSPADM

## 2023-10-20 RX ORDER — HYDROXYZINE HYDROCHLORIDE 25 MG/1
25 TABLET, FILM COATED ORAL 3 TIMES DAILY
Qty: 90 TABLET | Refills: 0 | Status: CANCELLED | OUTPATIENT
Start: 2023-10-20 | End: 2023-11-19

## 2023-10-20 ASSESSMENT — ENCOUNTER SYMPTOMS
CHILLS: 0
FLANK PAIN: 0
DIFFICULTY URINATING: 0
GASTROINTESTINAL NEGATIVE: 1
FEVER: 0
HEMATURIA: 0
DYSURIA: 0
FREQUENCY: 1

## 2023-10-20 NOTE — ASSESSMENT & PLAN NOTE
- H/o SEVERE HLD - most recent fasting lipid panel showed total cholesterol of 320, HDL 37.5, , chol/HDL raio 8.5  - Pt has made dietary changes   - Will check updated fasting lipid panel   - Educated about low cholesterol diet  - If persistently elevated - likely familial hld - start statin

## 2023-10-20 NOTE — PROGRESS NOTES
Subjective   Patient ID: Sheila Pollock is a 23 y.o. female who presents for Establish Care (Patient is being seen today to establish care. Patient was previously seeing Dr. Urban Duong @ [facility], last seen in June.  ) and Rash (Patient states x 2 weeks ago she broke out in a rash on her abdomen and chest area. Patient states it was itch. She went to see a doctor at [facility] and was given a injection and prescribed Medrol Mars. ).    HPI     Prevent/ Wellness Visit:   - Lives at home in -- with --   - Employment -   - Labs:   - PAP:   - Flu:  - Td:  - COVID-19 vax:   - Diet:   - Exercise:   - Sexual hx:   - Tobacco:   - Illicit drugs:   - Alcohol:   - Mood:   - Hobbies:   - Dentist visits:  - Eye exams:   - Sunscreen:  - Seatbelt:  - Fire detector:     Rash on ***:         Review of Systems    Objective   There were no vitals taken for this visit.    Physical Exam    Assessment/Plan   Problem List Items Addressed This Visit    None

## 2023-10-20 NOTE — ASSESSMENT & PLAN NOTE
PREVENTIVE CARE SCREENING:  - Labs/ Lipid screen: DUE, ordered today     - PAP: DUE, pt agrees to schedule follow up visit for this as well as vaginal swabs as she is having some vaginal odor and itching   - Tdap: DUE, given today   - COVID-19 vax: DUE, given today   - Mood is good  - Home life is good, lives in [place] with mom    - Work life is good - part time at [employer]   - Full time student   - Discussed DIET - Well balanced, encouraged she continue to limit processed foods, sugary and greasy foods, fast foods. Increase healthy alternatives, whole grains, fruits vegetables.   - Encouraged to take daily vitamin D supplement   - Discussed EXERCISE - Recommended weight training for bone health and 30 minutes of cardiovascular exercise 5-7 days a week.  - Encouraged use of sunscreen daily  - Encouraged pt to get yearly eye and dental exams  - Encouraged to wear seatbelt   - Encouraged pt to have working smoke detector/ carbon monoxide detector and to have a fire escape plan  - Avoid cigarette smoking.   - Limit alcohol consumption.
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
## 2023-10-22 LAB — BACTERIA UR CULT: NORMAL

## 2023-11-02 ENCOUNTER — APPOINTMENT (OUTPATIENT)
Dept: PRIMARY CARE | Facility: CLINIC | Age: 23
End: 2023-11-02
Payer: COMMERCIAL

## 2023-11-03 ENCOUNTER — OFFICE VISIT (OUTPATIENT)
Dept: PRIMARY CARE | Facility: CLINIC | Age: 23
End: 2023-11-03
Payer: COMMERCIAL

## 2023-11-03 VITALS
SYSTOLIC BLOOD PRESSURE: 118 MMHG | DIASTOLIC BLOOD PRESSURE: 82 MMHG | RESPIRATION RATE: 18 BRPM | BODY MASS INDEX: 28.88 KG/M2 | OXYGEN SATURATION: 100 % | TEMPERATURE: 98 F | HEART RATE: 81 BPM | HEIGHT: 67 IN | WEIGHT: 184 LBS

## 2023-11-03 DIAGNOSIS — Z12.4 CERVICAL CANCER SCREENING: ICD-10-CM

## 2023-11-03 DIAGNOSIS — R39.9 UTI SYMPTOMS: ICD-10-CM

## 2023-11-03 DIAGNOSIS — Z30.9 ENCOUNTER FOR CONTRACEPTIVE MANAGEMENT, UNSPECIFIED TYPE: Primary | ICD-10-CM

## 2023-11-03 DIAGNOSIS — N89.8 VAGINAL ODOR: ICD-10-CM

## 2023-11-03 PROBLEM — F20.81 SCHIZOPHRENIFORM DISORDER (MULTI): Status: RESOLVED | Noted: 2023-10-06 | Resolved: 2023-11-03

## 2023-11-03 PROCEDURE — 87205 SMEAR GRAM STAIN: CPT

## 2023-11-03 PROCEDURE — 3008F BODY MASS INDEX DOCD: CPT | Performed by: PHYSICIAN ASSISTANT

## 2023-11-03 PROCEDURE — 88175 CYTOPATH C/V AUTO FLUID REDO: CPT

## 2023-11-03 PROCEDURE — 1036F TOBACCO NON-USER: CPT | Performed by: PHYSICIAN ASSISTANT

## 2023-11-03 PROCEDURE — 88141 CYTOPATH C/V INTERPRET: CPT | Performed by: PATHOLOGY

## 2023-11-03 RX ORDER — DROSPIRENONE AND ETHINYL ESTRADIOL 0.02-3(28)
1 KIT ORAL DAILY
Qty: 28 TABLET | Refills: 12 | Status: SHIPPED | OUTPATIENT
Start: 2023-11-03 | End: 2024-03-05 | Stop reason: ALTCHOICE

## 2023-11-03 ASSESSMENT — ENCOUNTER SYMPTOMS: DYSURIA: 0
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
## 2023-11-04 LAB
CLUE CELLS VAG LPF-#/AREA: NORMAL /[LPF]
NUGENT SCORE: 3
YEAST VAG WET PREP-#/AREA: NORMAL

## 2023-11-21 LAB
CYTOLOGY CMNT CVX/VAG CYTO-IMP: NORMAL
LAB AP HPV GENOTYPE QUESTION: YES
LAB AP HPV HR: NORMAL
LABORATORY COMMENT REPORT: NORMAL
LMP START DATE: NORMAL
PATH REPORT.TOTAL CANCER: NORMAL

## 2024-02-04 ENCOUNTER — APPOINTMENT (OUTPATIENT)
Dept: CARDIOLOGY | Facility: HOSPITAL | Age: 24
End: 2024-02-04
Payer: COMMERCIAL

## 2024-02-04 ENCOUNTER — HOSPITAL ENCOUNTER (OUTPATIENT)
Facility: HOSPITAL | Age: 24
Setting detail: OBSERVATION
Discharge: HOME | End: 2024-02-05
Attending: EMERGENCY MEDICINE | Admitting: SURGERY
Payer: COMMERCIAL

## 2024-02-04 ENCOUNTER — APPOINTMENT (OUTPATIENT)
Dept: RADIOLOGY | Facility: HOSPITAL | Age: 24
End: 2024-02-04
Payer: COMMERCIAL

## 2024-02-04 DIAGNOSIS — R10.31 RIGHT LOWER QUADRANT ABDOMINAL PAIN: Primary | ICD-10-CM

## 2024-02-04 DIAGNOSIS — K59.00 CONSTIPATION, UNSPECIFIED CONSTIPATION TYPE: ICD-10-CM

## 2024-02-04 LAB
ALBUMIN SERPL BCP-MCNC: 3.9 G/DL (ref 3.4–5)
ALP SERPL-CCNC: 51 U/L (ref 33–110)
ALT SERPL W P-5'-P-CCNC: 9 U/L (ref 7–45)
AMYLASE SERPL-CCNC: 59 U/L (ref 29–103)
ANION GAP SERPL CALC-SCNC: 11 MMOL/L (ref 10–20)
APPEARANCE UR: ABNORMAL
AST SERPL W P-5'-P-CCNC: 12 U/L (ref 9–39)
ATRIAL RATE: 81 BPM
BASOPHILS # BLD AUTO: 0.05 X10*3/UL (ref 0–0.1)
BASOPHILS NFR BLD AUTO: 0.5 %
BILIRUB SERPL-MCNC: 0.2 MG/DL (ref 0–1.2)
BILIRUB UR STRIP.AUTO-MCNC: NEGATIVE MG/DL
BUN SERPL-MCNC: 8 MG/DL (ref 6–23)
CALCIUM SERPL-MCNC: 9 MG/DL (ref 8.6–10.3)
CAOX CRY #/AREA UR COMP ASSIST: ABNORMAL /HPF
CHLORIDE SERPL-SCNC: 106 MMOL/L (ref 98–107)
CO2 SERPL-SCNC: 23 MMOL/L (ref 21–32)
COLOR UR: YELLOW
CREAT SERPL-MCNC: 0.63 MG/DL (ref 0.5–1.05)
EGFRCR SERPLBLD CKD-EPI 2021: >90 ML/MIN/1.73M*2
EOSINOPHIL # BLD AUTO: 0.16 X10*3/UL (ref 0–0.7)
EOSINOPHIL NFR BLD AUTO: 1.7 %
ERYTHROCYTE [DISTWIDTH] IN BLOOD BY AUTOMATED COUNT: 12.4 % (ref 11.5–14.5)
GLUCOSE SERPL-MCNC: 95 MG/DL (ref 74–99)
GLUCOSE UR STRIP.AUTO-MCNC: NEGATIVE MG/DL
HCT VFR BLD AUTO: 36.7 % (ref 36–46)
HGB BLD-MCNC: 12.7 G/DL (ref 12–16)
HOLD SPECIMEN: NORMAL
HYALINE CASTS #/AREA URNS AUTO: ABNORMAL /LPF
IMM GRANULOCYTES # BLD AUTO: 0.02 X10*3/UL (ref 0–0.7)
IMM GRANULOCYTES NFR BLD AUTO: 0.2 % (ref 0–0.9)
KETONES UR STRIP.AUTO-MCNC: ABNORMAL MG/DL
LACTATE SERPL-SCNC: 1 MMOL/L (ref 0.4–2)
LEUKOCYTE ESTERASE UR QL STRIP.AUTO: ABNORMAL
LIPASE SERPL-CCNC: 17 U/L (ref 9–82)
LYMPHOCYTES # BLD AUTO: 2.23 X10*3/UL (ref 1.2–4.8)
LYMPHOCYTES NFR BLD AUTO: 24.3 %
MCH RBC QN AUTO: 31.5 PG (ref 26–34)
MCHC RBC AUTO-ENTMCNC: 34.6 G/DL (ref 32–36)
MCV RBC AUTO: 91 FL (ref 80–100)
MONOCYTES # BLD AUTO: 0.6 X10*3/UL (ref 0.1–1)
MONOCYTES NFR BLD AUTO: 6.5 %
MUCOUS THREADS #/AREA URNS AUTO: ABNORMAL /LPF
NEUTROPHILS # BLD AUTO: 6.13 X10*3/UL (ref 1.2–7.7)
NEUTROPHILS NFR BLD AUTO: 66.8 %
NITRITE UR QL STRIP.AUTO: NEGATIVE
NRBC BLD-RTO: 0 /100 WBCS (ref 0–0)
P AXIS: 68 DEGREES
P OFFSET: 162 MS
P ONSET: 122 MS
PH UR STRIP.AUTO: 5 [PH]
PLATELET # BLD AUTO: 341 X10*3/UL (ref 150–450)
POTASSIUM SERPL-SCNC: 3.7 MMOL/L (ref 3.5–5.3)
PR INTERVAL: 196 MS
PREGNANCY TEST URINE, POC: NEGATIVE
PROT SERPL-MCNC: 7.3 G/DL (ref 6.4–8.2)
PROT UR STRIP.AUTO-MCNC: NEGATIVE MG/DL
Q ONSET: 220 MS
QRS COUNT: 13 BEATS
QRS DURATION: 80 MS
QT INTERVAL: 382 MS
QTC CALCULATION(BAZETT): 443 MS
QTC FREDERICIA: 422 MS
R AXIS: 43 DEGREES
RBC # BLD AUTO: 4.03 X10*6/UL (ref 4–5.2)
RBC # UR STRIP.AUTO: NEGATIVE /UL
RBC #/AREA URNS AUTO: ABNORMAL /HPF
SODIUM SERPL-SCNC: 136 MMOL/L (ref 136–145)
SP GR UR STRIP.AUTO: 1.03
SQUAMOUS #/AREA URNS AUTO: ABNORMAL /HPF
T AXIS: 22 DEGREES
T OFFSET: 411 MS
UROBILINOGEN UR STRIP.AUTO-MCNC: 2 MG/DL
VENTRICULAR RATE: 81 BPM
WBC # BLD AUTO: 9.2 X10*3/UL (ref 4.4–11.3)
WBC #/AREA URNS AUTO: ABNORMAL /HPF

## 2024-02-04 PROCEDURE — G0378 HOSPITAL OBSERVATION PER HR: HCPCS

## 2024-02-04 PROCEDURE — 99285 EMERGENCY DEPT VISIT HI MDM: CPT | Mod: 25 | Performed by: EMERGENCY MEDICINE

## 2024-02-04 PROCEDURE — 2500000004 HC RX 250 GENERAL PHARMACY W/ HCPCS (ALT 636 FOR OP/ED)

## 2024-02-04 PROCEDURE — 96361 HYDRATE IV INFUSION ADD-ON: CPT

## 2024-02-04 PROCEDURE — 83690 ASSAY OF LIPASE: CPT | Performed by: EMERGENCY MEDICINE

## 2024-02-04 PROCEDURE — 93005 ELECTROCARDIOGRAM TRACING: CPT

## 2024-02-04 PROCEDURE — 81001 URINALYSIS AUTO W/SCOPE: CPT | Performed by: EMERGENCY MEDICINE

## 2024-02-04 PROCEDURE — 96375 TX/PRO/DX INJ NEW DRUG ADDON: CPT

## 2024-02-04 PROCEDURE — 2550000001 HC RX 255 CONTRASTS: Performed by: EMERGENCY MEDICINE

## 2024-02-04 PROCEDURE — C9113 INJ PANTOPRAZOLE SODIUM, VIA: HCPCS | Performed by: EMERGENCY MEDICINE

## 2024-02-04 PROCEDURE — 36415 COLL VENOUS BLD VENIPUNCTURE: CPT | Performed by: EMERGENCY MEDICINE

## 2024-02-04 PROCEDURE — 96366 THER/PROPH/DIAG IV INF ADDON: CPT

## 2024-02-04 PROCEDURE — 83605 ASSAY OF LACTIC ACID: CPT | Performed by: EMERGENCY MEDICINE

## 2024-02-04 PROCEDURE — 82150 ASSAY OF AMYLASE: CPT | Performed by: EMERGENCY MEDICINE

## 2024-02-04 PROCEDURE — 2500000004 HC RX 250 GENERAL PHARMACY W/ HCPCS (ALT 636 FOR OP/ED): Performed by: EMERGENCY MEDICINE

## 2024-02-04 PROCEDURE — 81025 URINE PREGNANCY TEST: CPT | Performed by: EMERGENCY MEDICINE

## 2024-02-04 PROCEDURE — 80053 COMPREHEN METABOLIC PANEL: CPT | Performed by: EMERGENCY MEDICINE

## 2024-02-04 PROCEDURE — 96365 THER/PROPH/DIAG IV INF INIT: CPT

## 2024-02-04 PROCEDURE — 87086 URINE CULTURE/COLONY COUNT: CPT | Mod: ELYLAB | Performed by: EMERGENCY MEDICINE

## 2024-02-04 PROCEDURE — 85025 COMPLETE CBC W/AUTO DIFF WBC: CPT | Performed by: EMERGENCY MEDICINE

## 2024-02-04 PROCEDURE — 74177 CT ABD & PELVIS W/CONTRAST: CPT | Performed by: RADIOLOGY

## 2024-02-04 PROCEDURE — 99222 1ST HOSP IP/OBS MODERATE 55: CPT

## 2024-02-04 PROCEDURE — 74177 CT ABD & PELVIS W/CONTRAST: CPT

## 2024-02-04 RX ORDER — ACETAMINOPHEN 325 MG/1
650 TABLET ORAL EVERY 6 HOURS PRN
Status: DISCONTINUED | OUTPATIENT
Start: 2024-02-04 | End: 2024-02-05 | Stop reason: HOSPADM

## 2024-02-04 RX ORDER — OXYCODONE HYDROCHLORIDE 5 MG/1
10 TABLET ORAL EVERY 6 HOURS PRN
Status: DISCONTINUED | OUTPATIENT
Start: 2024-02-04 | End: 2024-02-05 | Stop reason: HOSPADM

## 2024-02-04 RX ORDER — PANTOPRAZOLE SODIUM 40 MG/1
40 TABLET, DELAYED RELEASE ORAL
Status: DISCONTINUED | OUTPATIENT
Start: 2024-02-05 | End: 2024-02-05 | Stop reason: HOSPADM

## 2024-02-04 RX ORDER — ONDANSETRON HYDROCHLORIDE 2 MG/ML
4 INJECTION, SOLUTION INTRAVENOUS ONCE
Status: COMPLETED | OUTPATIENT
Start: 2024-02-04 | End: 2024-02-04

## 2024-02-04 RX ORDER — PANTOPRAZOLE SODIUM 40 MG/10ML
40 INJECTION, POWDER, LYOPHILIZED, FOR SOLUTION INTRAVENOUS ONCE
Status: COMPLETED | OUTPATIENT
Start: 2024-02-04 | End: 2024-02-04

## 2024-02-04 RX ORDER — PANTOPRAZOLE SODIUM 40 MG/10ML
40 INJECTION, POWDER, LYOPHILIZED, FOR SOLUTION INTRAVENOUS DAILY
Status: DISCONTINUED | OUTPATIENT
Start: 2024-02-04 | End: 2024-02-04

## 2024-02-04 RX ORDER — OXYCODONE HYDROCHLORIDE 5 MG/1
5 TABLET ORAL EVERY 6 HOURS PRN
Status: DISCONTINUED | OUTPATIENT
Start: 2024-02-04 | End: 2024-02-05 | Stop reason: HOSPADM

## 2024-02-04 RX ORDER — KETOROLAC TROMETHAMINE 30 MG/ML
15 INJECTION, SOLUTION INTRAMUSCULAR; INTRAVENOUS EVERY 6 HOURS PRN
Status: DISCONTINUED | OUTPATIENT
Start: 2024-02-04 | End: 2024-02-04

## 2024-02-04 RX ORDER — SODIUM CHLORIDE, SODIUM LACTATE, POTASSIUM CHLORIDE, CALCIUM CHLORIDE 600; 310; 30; 20 MG/100ML; MG/100ML; MG/100ML; MG/100ML
100 INJECTION, SOLUTION INTRAVENOUS CONTINUOUS
Status: DISCONTINUED | OUTPATIENT
Start: 2024-02-04 | End: 2024-02-04

## 2024-02-04 RX ADMIN — SODIUM CHLORIDE, POTASSIUM CHLORIDE, SODIUM LACTATE AND CALCIUM CHLORIDE 100 ML/HR: 600; 310; 30; 20 INJECTION, SOLUTION INTRAVENOUS at 09:08

## 2024-02-04 RX ADMIN — SODIUM CHLORIDE 10 ML: 9 INJECTION, SOLUTION INTRAVENOUS at 15:29

## 2024-02-04 RX ADMIN — SODIUM CHLORIDE 1000 ML: 9 INJECTION, SOLUTION INTRAVENOUS at 04:40

## 2024-02-04 RX ADMIN — PANTOPRAZOLE SODIUM 40 MG: 40 INJECTION, POWDER, FOR SOLUTION INTRAVENOUS at 02:54

## 2024-02-04 RX ADMIN — ONDANSETRON 4 MG: 2 INJECTION INTRAMUSCULAR; INTRAVENOUS at 02:43

## 2024-02-04 RX ADMIN — PIPERACILLIN SODIUM AND TAZOBACTAM SODIUM 3.38 G: 3; .375 INJECTION, SOLUTION INTRAVENOUS at 07:25

## 2024-02-04 RX ADMIN — PIPERACILLIN SODIUM AND TAZOBACTAM SODIUM 3.38 G: 3; .375 INJECTION, SOLUTION INTRAVENOUS at 21:25

## 2024-02-04 RX ADMIN — IOHEXOL 75 ML: 350 INJECTION, SOLUTION INTRAVENOUS at 04:56

## 2024-02-04 RX ADMIN — PIPERACILLIN SODIUM AND TAZOBACTAM SODIUM 3.38 G: 3; .375 INJECTION, SOLUTION INTRAVENOUS at 13:00

## 2024-02-04 RX ADMIN — SODIUM CHLORIDE, POTASSIUM CHLORIDE, SODIUM LACTATE AND CALCIUM CHLORIDE 500 ML: 600; 310; 30; 20 INJECTION, SOLUTION INTRAVENOUS at 09:08

## 2024-02-04 RX ADMIN — KETOROLAC TROMETHAMINE 15 MG: 30 INJECTION, SOLUTION INTRAMUSCULAR; INTRAVENOUS at 09:07

## 2024-02-04 RX ADMIN — SODIUM CHLORIDE 1000 ML: 9 INJECTION, SOLUTION INTRAVENOUS at 02:29

## 2024-02-04 SDOH — SOCIAL STABILITY: SOCIAL INSECURITY: ABUSE: ADULT

## 2024-02-04 SDOH — SOCIAL STABILITY: SOCIAL INSECURITY: DO YOU FEEL ANYONE HAS EXPLOITED OR TAKEN ADVANTAGE OF YOU FINANCIALLY OR OF YOUR PERSONAL PROPERTY?: NO

## 2024-02-04 SDOH — SOCIAL STABILITY: SOCIAL INSECURITY: HAVE YOU HAD THOUGHTS OF HARMING ANYONE ELSE?: NO

## 2024-02-04 SDOH — SOCIAL STABILITY: SOCIAL INSECURITY: ARE YOU OR HAVE YOU BEEN THREATENED OR ABUSED PHYSICALLY, EMOTIONALLY, OR SEXUALLY BY ANYONE?: NO

## 2024-02-04 SDOH — SOCIAL STABILITY: SOCIAL INSECURITY: ARE THERE ANY APPARENT SIGNS OF INJURIES/BEHAVIORS THAT COULD BE RELATED TO ABUSE/NEGLECT?: NO

## 2024-02-04 SDOH — SOCIAL STABILITY: SOCIAL INSECURITY: DO YOU FEEL UNSAFE GOING BACK TO THE PLACE WHERE YOU ARE LIVING?: NO

## 2024-02-04 SDOH — SOCIAL STABILITY: SOCIAL INSECURITY: DOES ANYONE TRY TO KEEP YOU FROM HAVING/CONTACTING OTHER FRIENDS OR DOING THINGS OUTSIDE YOUR HOME?: NO

## 2024-02-04 SDOH — SOCIAL STABILITY: SOCIAL INSECURITY: WERE YOU ABLE TO COMPLETE ALL THE BEHAVIORAL HEALTH SCREENINGS?: YES

## 2024-02-04 SDOH — SOCIAL STABILITY: SOCIAL INSECURITY: HAS ANYONE EVER THREATENED TO HURT YOUR FAMILY OR YOUR PETS?: NO

## 2024-02-04 ASSESSMENT — ENCOUNTER SYMPTOMS
CARDIOVASCULAR NEGATIVE: 1
NEUROLOGICAL NEGATIVE: 1
PSYCHIATRIC NEGATIVE: 1
RESPIRATORY NEGATIVE: 1
CONSTITUTIONAL NEGATIVE: 1
VOMITING: 0
EYES NEGATIVE: 1
HEMATOLOGIC/LYMPHATIC NEGATIVE: 1
ABDOMINAL PAIN: 1
MUSCULOSKELETAL NEGATIVE: 1
NAUSEA: 0

## 2024-02-04 ASSESSMENT — ACTIVITIES OF DAILY LIVING (ADL)
JUDGMENT_ADEQUATE_SAFELY_COMPLETE_DAILY_ACTIVITIES: YES
ADEQUATE_TO_COMPLETE_ADL: YES
LACK_OF_TRANSPORTATION: NO
FEEDING YOURSELF: INDEPENDENT
GROOMING: INDEPENDENT
DRESSING YOURSELF: INDEPENDENT
HEARING - LEFT EAR: FUNCTIONAL
TOILETING: INDEPENDENT
BATHING: INDEPENDENT
PATIENT'S MEMORY ADEQUATE TO SAFELY COMPLETE DAILY ACTIVITIES?: YES
HEARING - RIGHT EAR: FUNCTIONAL
WALKS IN HOME: INDEPENDENT

## 2024-02-04 ASSESSMENT — COGNITIVE AND FUNCTIONAL STATUS - GENERAL
DAILY ACTIVITIY SCORE: 24
MOBILITY SCORE: 24
DAILY ACTIVITIY SCORE: 24
PATIENT BASELINE BEDBOUND: NO
MOBILITY SCORE: 24

## 2024-02-04 ASSESSMENT — PAIN SCALES - GENERAL
PAINLEVEL_OUTOF10: 1
PAINLEVEL_OUTOF10: 0 - NO PAIN
PAINLEVEL_OUTOF10: 3
PAINLEVEL_OUTOF10: 4

## 2024-02-04 ASSESSMENT — COLUMBIA-SUICIDE SEVERITY RATING SCALE - C-SSRS
2. HAVE YOU ACTUALLY HAD ANY THOUGHTS OF KILLING YOURSELF?: NO
1. IN THE PAST MONTH, HAVE YOU WISHED YOU WERE DEAD OR WISHED YOU COULD GO TO SLEEP AND NOT WAKE UP?: NO
6. HAVE YOU EVER DONE ANYTHING, STARTED TO DO ANYTHING, OR PREPARED TO DO ANYTHING TO END YOUR LIFE?: NO

## 2024-02-04 ASSESSMENT — PATIENT HEALTH QUESTIONNAIRE - PHQ9
SUM OF ALL RESPONSES TO PHQ9 QUESTIONS 1 & 2: 0
2. FEELING DOWN, DEPRESSED OR HOPELESS: NOT AT ALL
1. LITTLE INTEREST OR PLEASURE IN DOING THINGS: NOT AT ALL

## 2024-02-04 ASSESSMENT — LIFESTYLE VARIABLES
HOW MANY STANDARD DRINKS CONTAINING ALCOHOL DO YOU HAVE ON A TYPICAL DAY: PATIENT DOES NOT DRINK
HAVE YOU EVER FELT YOU SHOULD CUT DOWN ON YOUR DRINKING: NO
SKIP TO QUESTIONS 9-10: 1
HOW OFTEN DO YOU HAVE 6 OR MORE DRINKS ON ONE OCCASION: NEVER
AUDIT-C TOTAL SCORE: 0
HOW OFTEN DO YOU HAVE A DRINK CONTAINING ALCOHOL: NEVER
HAVE PEOPLE ANNOYED YOU BY CRITICIZING YOUR DRINKING: NO
EVER FELT BAD OR GUILTY ABOUT YOUR DRINKING: NO
EVER HAD A DRINK FIRST THING IN THE MORNING TO STEADY YOUR NERVES TO GET RID OF A HANGOVER: NO
AUDIT-C TOTAL SCORE: 0

## 2024-02-04 ASSESSMENT — PAIN - FUNCTIONAL ASSESSMENT
PAIN_FUNCTIONAL_ASSESSMENT: 0-10

## 2024-02-04 ASSESSMENT — PAIN DESCRIPTION - ONSET: ONSET: AWAKENED FROM SLEEP

## 2024-02-04 ASSESSMENT — PAIN DESCRIPTION - LOCATION: LOCATION: ABDOMEN

## 2024-02-04 ASSESSMENT — PAIN DESCRIPTION - ORIENTATION: ORIENTATION: LEFT;UPPER

## 2024-02-04 ASSESSMENT — PAIN DESCRIPTION - DESCRIPTORS: DESCRIPTORS: BURNING;DULL

## 2024-02-04 ASSESSMENT — PAIN DESCRIPTION - PROGRESSION: CLINICAL_PROGRESSION: RAPIDLY IMPROVING

## 2024-02-04 ASSESSMENT — PAIN DESCRIPTION - PAIN TYPE: TYPE: ACUTE PAIN

## 2024-02-04 NOTE — ED PROVIDER NOTES
HPI   Chief Complaint   Patient presents with    Abdominal Pain     Left sided abdominal pain started approximately one hour ago. Denies n/v/d, no dysuria.        Chief complaint abdominal pain  History of present illness this is a 23-year-old  female who has midepigastric pain apparently had this discomfort earlier today after eating some food that she typically does not.  Positive nausea no vomiting denies history of cholelithiasis or esophagitis                          Jim Coma Scale Score: 15                  Patient History   Past Medical History:   Diagnosis Date    Body mass index (BMI) 23.0-23.9, adult 02/10/2022    Body mass index (BMI) of 23.0 to 23.9 in adult    Body mass index (BMI) 25.0-25.9, adult 10/01/2021    Body mass index (BMI) of 25.0 to 25.9 in adult    Depression     Hyperlipidemia     Personal history of other diseases of the respiratory system 01/27/2022    History of acute pharyngitis    Personal history of other infectious and parasitic diseases 08/17/2021    History of candidiasis of vagina    Personal history of other specified conditions 02/10/2022    History of chest pain    Personal history of urinary (tract) infections 02/10/2022    History of urinary tract infection    Unspecified asthma with (acute) exacerbation 02/10/2022    Asthma exacerbation     Past Surgical History:   Procedure Laterality Date    CARDIAC SURGERY  02/10/2022    4 or 5 years old per patient.    MANDIBLE SURGERY  02/10/2022    in 2016 to align jaw after braces.     Family History   Problem Relation Name Age of Onset    Hyperlipidemia Mother      Cancer Maternal Grandmother          in remission.    Hyperlipidemia Maternal Grandfather      Cancer Maternal Grandfather          testicular     Social History     Tobacco Use    Smoking status: Never    Smokeless tobacco: Never   Vaping Use    Vaping Use: Never used   Substance Use Topics    Alcohol use: Not Currently    Drug use: Not Currently        Physical Exam   ED Triage Vitals [02/04/24 0209]   Temperature Heart Rate Respirations BP   36.5 °C (97.7 °F) 89 16 127/77      Pulse Ox Temp Source Heart Rate Source Patient Position   (!) 82 % Temporal -- Sitting      BP Location FiO2 (%)     Right arm --       Physical Exam  Vitals and nursing note reviewed. Exam conducted with a chaperone present.   Constitutional:       Appearance: She is well-developed and normal weight.   HENT:      Head: Normocephalic and atraumatic.      Mouth/Throat:      Mouth: Mucous membranes are moist.      Pharynx: Oropharynx is clear.   Eyes:      Extraocular Movements: Extraocular movements intact.      Pupils: Pupils are equal, round, and reactive to light.   Cardiovascular:      Rate and Rhythm: Normal rate and regular rhythm.   Pulmonary:      Effort: Pulmonary effort is normal.      Breath sounds: Normal breath sounds.   Abdominal:      General: Bowel sounds are increased. There is distension.      Palpations: Abdomen is rigid.      Tenderness: There is generalized abdominal tenderness and tenderness in the right upper quadrant and epigastric area.      Hernia: There is no hernia in the right femoral area.   Skin:     General: Skin is dry.      Capillary Refill: Capillary refill takes 2 to 3 seconds.   Neurological:      General: No focal deficit present.      Mental Status: She is alert.         ED Course & MDM   Diagnoses as of 02/04/24 0626   Right lower quadrant abdominal pain       Medical Decision Making  Differential diagnosis considered for this patient  #1 diverticulitis  #2 ureteral stone  #3 pneumoperitoneum  #4 pancreatitis  #5 urinary tract infection  #6 bowel obstruction  #7 terminal ileitis  Considering the above differential diagnosis following testing treatments were considered in order with shared decision-making  CT of the abdomen pelvis IV contrast IV fluid bolus Zofran IV Protonix  Urinalysis, CBC hepatic profile lactic acid amylase lipase Zosyn IV        Amount and/or Complexity of Data Reviewed  Labs: ordered. Decision-making details documented in ED Course.     Details: urinalysis is clear of infection glucose 95 sodium 136 potassium 3 7 bicarb 23 BUN of 8 creatinine 0.63 lactic of 1 lipase of 17 amylase 59 white count 9 2 hemoglobin of 12 macro 36  Radiology: ordered and independent interpretation performed.     Details: CT of the abdomen reveals colitis slightly dilated tip of the appendix.  Discussion of management or test interpretation with external provider(s): Case discussed with Dr. Montoya and the concern early appendicitis patient is a healthy patient    Risk  Decision regarding hospitalization.      Labs Reviewed   URINALYSIS WITH REFLEX CULTURE AND MICROSCOPIC - Abnormal       Result Value    Color, Urine Yellow      Appearance, Urine Hazy (*)     Specific Gravity, Urine 1.030      pH, Urine 5.0      Protein, Urine NEGATIVE      Glucose, Urine NEGATIVE      Blood, Urine NEGATIVE      Ketones, Urine 5 (TRACE) (*)     Bilirubin, Urine NEGATIVE      Urobilinogen, Urine 2.0 (*)     Nitrite, Urine NEGATIVE      Leukocyte Esterase, Urine TRACE (*)    MICROSCOPIC ONLY, URINE - Abnormal    WBC, Urine 11-20 (*)     RBC, Urine 3-5      Squamous Epithelial Cells, Urine 10-25 (FEW)      Mucus, Urine 1+      Hyaline Casts, Urine 1+ (*)     Calcium Oxalate Crystals, Urine 4+ (*)    COMPREHENSIVE METABOLIC PANEL - Normal    Glucose 95      Sodium 136      Potassium 3.7      Chloride 106      Bicarbonate 23      Anion Gap 11      Urea Nitrogen 8      Creatinine 0.63      eGFR >90      Calcium 9.0      Albumin 3.9      Alkaline Phosphatase 51      Total Protein 7.3      AST 12      Bilirubin, Total 0.2      ALT 9     LIPASE - Normal    Lipase 17      Narrative:     Venipuncture immediately after or during the administration of Metamizole may lead to falsely low results. Testing should be performed immediately prior to Metamizole dosing.   LACTATE - Normal    Lactate  1.0      Narrative:     Venipuncture immediately after or during the administration of Metamizole may lead to falsely low results. Testing should be performed immediately  prior to Metamizole dosing.   AMYLASE - Normal    Amylase 59     POCT PREGNANCY, URINE - Normal    Preg Test, Ur Negative     URINE CULTURE   CBC WITH AUTO DIFFERENTIAL    WBC 9.2      nRBC 0.0      RBC 4.03      Hemoglobin 12.7      Hematocrit 36.7      MCV 91      MCH 31.5      MCHC 34.6      RDW 12.4      Platelets 341      Neutrophils % 66.8      Immature Granulocytes %, Automated 0.2      Lymphocytes % 24.3      Monocytes % 6.5      Eosinophils % 1.7      Basophils % 0.5      Neutrophils Absolute 6.13      Immature Granulocytes Absolute, Automated 0.02      Lymphocytes Absolute 2.23      Monocytes Absolute 0.60      Eosinophils Absolute 0.16      Basophils Absolute 0.05     URINALYSIS WITH REFLEX CULTURE AND MICROSCOPIC    Narrative:     The following orders were created for panel order Urinalysis with Reflex Culture and Microscopic.  Procedure                               Abnormality         Status                     ---------                               -----------         ------                     Urinalysis with Reflex C...[[id number]]  Abnormal            Final result               Extra Urine Gray Tube[[id number]]                            In process                   Please view results for these tests on the individual orders.   EXTRA URINE GRAY TUBE        CT abdomen pelvis w IV contrast   Final Result   1.  Wall thickening at the ascending colon, suggestive of colitis.   Follow-up colonoscopy after treatment recommended to exclude   underlying neoplasm.   2. Constipation with moderate amount of stool at the colon.   3. Appendicoliths are present at the appendix. Mild dilation at the   tip of the appendix to 9 mm. Clinical correlation recommended to   exclude early/developing acute appendicitis.   4. Circumferential wall  thickening of the urinary bladder, may be   secondary to underdistention. Please correlate with urinalysis to   exclude superimposed cystitis.   5. Hepatic lesions. Nonemergent contrast-enhanced MRI can be obtained   for further characterization.   6. Increased soft tissue density at the umbilicus. Please correlate   with clinical exam.             MACRO:   Critical Finding:  See findings. Notification was initiated on   2/4/2024 at 5:56 am by  Opal Pretty.  (**-YCF-**) Instructions:        Signed by: Opal Pretty 2/4/2024 6:02 AM   Dictation workstation:   [id number]             Procedure  ECG 12 lead    Performed by: Daisha Myrick MD  Authorized by: Daisha Myrick MD    ECG interpreted by ED Physician in the absence of a cardiologist: yes    Interpretation:     Interpretation: normal    Rate:     ECG rate:  81    ECG rate assessment: normal    Rhythm:     Rhythm: sinus rhythm    Ectopy:     Ectopy: none    QRS:     QRS axis:  Normal  ST segments:     ST segments:  Normal  T waves:     T waves: normal         Daisha Myrick MD  02/04/24 0626

## 2024-02-04 NOTE — CARE PLAN
The patient's goals for the shift include      The clinical goals for the shift include maintain pain score of 3 or less

## 2024-02-04 NOTE — H&P
"General Surgery History and Physical  Patient: Sheila Pollock  Unit/Bed: AC10/10  YOB: 2000  MRN: [id number]  Acct: [id number]   Admitting Diagnosis: No admission diagnoses are documented for this encounter.  Date:  2/4/2024  Hospital Day: 0  Attending: Daisha Myrick MD       Complaint:  Chief Complaint   Patient presents with    Abdominal Pain     Left sided abdominal pain started approximately one hour ago. Denies n/v/d, no dysuria.         History of Present Illness:  23-year-old -American female who presented to the [place] ED with mid epigastric pain.  She states the discomfort occurred earlier today at and woke her up from sleep.  She states that the pain felt like a \"gas bubble\" and \"burning\" she had the pain at that time was a 10/10 on the pain scale.  Patient took Pepto-Bismol and an antacid but did not have any relief.  She states that the pain as waxing and waning but never completely goes away.  Currently her pain is rated at a 3 of 10 on the pain scale.    Allergies:  Allergies   Allergen Reactions    Peanut Oil Swelling    Banana Itching     Throat gets itchy    Other Swelling    Peanut Swelling    Soy Other        PMHx:  Past Medical History:   Diagnosis Date    Body mass index (BMI) 23.0-23.9, adult 02/10/2022    Body mass index (BMI) of 23.0 to 23.9 in adult    Body mass index (BMI) 25.0-25.9, adult 10/01/2021    Body mass index (BMI) of 25.0 to 25.9 in adult    Depression     Hyperlipidemia     Personal history of other diseases of the respiratory system 01/27/2022    History of acute pharyngitis    Personal history of other infectious and parasitic diseases 08/17/2021    History of candidiasis of vagina    Personal history of other specified conditions 02/10/2022    History of chest pain    Personal history of urinary (tract) infections 02/10/2022    History of urinary tract infection    Unspecified asthma with (acute) exacerbation 02/10/2022    Asthma exacerbation "       PSHx:  Past Surgical History:   Procedure Laterality Date    CARDIAC SURGERY  02/10/2022    4 or 5 years old per patient.    MANDIBLE SURGERY  02/10/2022    in 2016 to align jaw after braces.       Social Hx:  Social History     Socioeconomic History    Marital status: Single     Spouse name: None    Number of children: None    Years of education: None    Highest education level: None   Occupational History    None   Tobacco Use    Smoking status: Never    Smokeless tobacco: Never   Vaping Use    Vaping Use: Never used   Substance and Sexual Activity    Alcohol use: Not Currently    Drug use: Not Currently    Sexual activity: Not Currently   Other Topics Concern    None   Social History Narrative    None     Social Determinants of Health     Financial Resource Strain: Not on file   Food Insecurity: Not on file   Transportation Needs: Not on file   Physical Activity: Not on file   Stress: Not on file   Social Connections: Not on file   Intimate Partner Violence: Not on file   Housing Stability: Not on file       Family Hx:  Family History   Problem Relation Name Age of Onset    Hyperlipidemia Mother      Cancer Maternal Grandmother          in remission.    Hyperlipidemia Maternal Grandfather      Cancer Maternal Grandfather          testicular       Review of Systems:   Review of Systems   Constitutional: Negative.    HENT: Negative.     Eyes: Negative.    Respiratory: Negative.     Cardiovascular: Negative.    Gastrointestinal:  Positive for abdominal pain. Negative for nausea and vomiting.   Genitourinary: Negative.    Musculoskeletal: Negative.    Neurological: Negative.    Hematological: Negative.    Psychiatric/Behavioral: Negative.         Physical Examination:    Visit Vitals  /69 (BP Location: Left arm, Patient Position: Lying)   Pulse 91   Temp 36.5 °C (97.7 °F) (Temporal)   Resp 18      Physical Exam  Constitutional:       Appearance: Normal appearance.   HENT:      Head: Normocephalic and  atraumatic.      Nose: Nose normal.      Mouth/Throat:      Mouth: Mucous membranes are moist.   Cardiovascular:      Rate and Rhythm: Normal rate and regular rhythm.   Pulmonary:      Effort: Pulmonary effort is normal.      Breath sounds: Normal breath sounds.   Abdominal:      General: There is distension.      Palpations: Abdomen is soft.      Tenderness: There is abdominal tenderness in the left upper quadrant. There is no guarding or rebound. Negative signs include Rovsing's sign, psoas sign and obturator sign.      Hernia: No hernia is present.      Comments: Mild distention   Musculoskeletal:         General: Normal range of motion.   Skin:     General: Skin is warm and dry.      Capillary Refill: Capillary refill takes less than 2 seconds.   Neurological:      Mental Status: She is alert and oriented to person, place, and time.   Psychiatric:         Mood and Affect: Mood normal.         Behavior: Behavior normal.         LABS:  CBC:   Lab Results   Component Value Date    WBC 9.2 02/04/2024    RBC 4.03 02/04/2024    HGB 12.7 02/04/2024    HCT 36.7 02/04/2024    MCV 91 02/04/2024    MCH 31.5 02/04/2024    MCHC 34.6 02/04/2024    RDW 12.4 02/04/2024     02/04/2024     CBC with Differential:    Lab Results   Component Value Date    WBC 9.2 02/04/2024    RBC 4.03 02/04/2024    HGB 12.7 02/04/2024    HCT 36.7 02/04/2024     02/04/2024    MCV 91 02/04/2024    MCH 31.5 02/04/2024    MCHC 34.6 02/04/2024    RDW 12.4 02/04/2024    NRBC 0.0 02/04/2024    LYMPHOPCT 24.3 02/04/2024    MONOPCT 6.5 02/04/2024    EOSPCT 1.7 02/04/2024    BASOPCT 0.5 02/04/2024    MONOSABS 0.60 02/04/2024    LYMPHSABS 2.23 02/04/2024    EOSABS 0.16 02/04/2024    BASOSABS 0.05 02/04/2024     CMP:    Lab Results   Component Value Date     02/04/2024    K 3.7 02/04/2024     02/04/2024    CO2 23 02/04/2024    BUN 8 02/04/2024    CREATININE 0.63 02/04/2024    GLUCOSE 95 02/04/2024    PROT 7.3 02/04/2024    CALCIUM 9.0  "02/04/2024    BILITOT 0.2 02/04/2024    ALKPHOS 51 02/04/2024    AST 12 02/04/2024    ALT 9 02/04/2024     BMP:    Lab Results   Component Value Date     02/04/2024    K 3.7 02/04/2024     02/04/2024    CO2 23 02/04/2024    BUN 8 02/04/2024    CREATININE 0.63 02/04/2024    CALCIUM 9.0 02/04/2024    GLUCOSE 95 02/04/2024     Magnesium:No results found for: \"MG\"  Troponin:  No results found for: \"TROPHS\"  Lipid Panel:  No results found for: \"HDL\", \"CHHDL\", \"VLDL\", \"TRIG\", \"NHDL\"     Current Medications:    Current Facility-Administered Medications:     piperacillin-tazobactam-dextrose (Zosyn) IV 3.375 g, 3.375 g, intravenous, Once, Daisha Myrick MD    Current Outpatient Medications:     albuterol 90 mcg/actuation inhaler, Inhale 2 puffs every 6 hours if needed., Disp: , Rfl:     clobetasol (Temovate) 0.05 % cream, Apply topically 2 times a day. Until rash resolves, Disp: 60 g, Rfl: 0    drospirenone-ethinyl estradioL (Cleopatra, 28,) 3-0.02 mg tablet, Take 1 tablet by mouth once daily., Disp: 28 tablet, Rfl: 12    EPINEPHrine 0.3 mg/0.3 mL injection syringe, Inject 0.3 mL (0.3 mg) into the muscle 1 time if needed. As Directed, Disp: , Rfl:     fluticasone (Flonase) 50 mcg/actuation nasal spray, Administer 1 spray into each nostril once daily., Disp: , Rfl:     ECG 12 lead    Result Date: 2/4/2024  Normal sinus rhythm Nonspecific T wave abnormality Abnormal ECG When compared with ECG of 17-SEP-2022 08:13, Previous ECG has undetermined rhythm, needs review Nonspecific T wave abnormality has replaced inverted T waves in Anterior leads    CT abdomen pelvis w IV contrast    Result Date: 2/4/2024  Interpreted By:  Opal Pretty, STUDY: CT ABDOMEN PELVIS W IV CONTRAST;  2/4/2024 4:57 am   INDICATION: Signs/Symptoms:Abdominal pain.   COMPARISON: None   ACCESSION NUMBER(S): [id number]   ORDERING CLINICIAN: DAISHA MYRICK   TECHNIQUE: Axial CT of the abdomen and pelvis was performed. Coronal and sagittal " reconstructions were performed.   Intravenous contrast material was administered without immediate complication.   FINDINGS: LOWER CHEST: Evaluation degraded by motion artifact. No consolidation or pleural effusion.   ABDOMEN:   LIVER: The most superior aspect of the right hepatic lobe is not included in the field of view. There is a 1.5 cm lesion along the medial aspect of the right hepatic lobe, segment 7 (series 201, axial image 11). Additional 1.0 cm lesion is noted at the periphery of the right hepatic lobe, segment 8 (series 201, axial image 21).   BILE DUCTS: No significant dilation.   GALLBLADDER: Present.   PANCREAS: No peripancreatic inflammatory changes.   SPLEEN: Unremarkable.   ADRENAL GLANDS: Unremarkable.   KIDNEYS AND URETERS: Symmetrical renal enhancement.  No hydronephrosis or hydroureter is identified.   PELVIS:   BLADDER: The urinary bladder is partially distended with circumferential wall thickening.   REPRODUCTIVE ORGANS: The uterus is present.   BOWEL: The stomach is partially distended. No bowel obstruction. There is wall thickening at the ascending colon. Moderate amount of stool is noted at the colon. The appendix extends along the right side of the pelvis. Small appendicoliths are noted at the appendix. There is mild dilation at the tip of the appendix to 9 mm. No significant periappendiceal soft tissue stranding.   VESSELS: No abdominal aortic aneurysm.   PERITONEUM/RETROPERITONEUM/LYMPH NODES: No free fluid or free air.  No retroperitoneal hemorrhage. No pathologically enlarged lymph nodes are identified.   BONES AND ABDOMINAL WALL: No acute osseous changes. There is increased soft tissue density at the umbilicus.       1.  Wall thickening at the ascending colon, suggestive of colitis. Follow-up colonoscopy after treatment recommended to exclude underlying neoplasm. 2. Constipation with moderate amount of stool at the colon. 3. Appendicoliths are present at the appendix. Mild dilation at  "the tip of the appendix to 9 mm. Clinical correlation recommended to exclude early/developing acute appendicitis. 4. Circumferential wall thickening of the urinary bladder, may be secondary to underdistention. Please correlate with urinalysis to exclude superimposed cystitis. 5. Hepatic lesions. Nonemergent contrast-enhanced MRI can be obtained for further characterization. 6. Increased soft tissue density at the umbilicus. Please correlate with clinical exam.     MACRO: Critical Finding:  See findings. Notification was initiated on 2/4/2024 at 5:56 am by  Oapl Pretty.  (**-YCF-**) Instructions:   Signed by: Opal Pretty 2/4/2024 6:02 AM Dictation workstation:   [id number]       No results found for this or any previous visit from the past 1095 days.     Assessment:    23-year-old female who presented to [place] ED with complaints of abdominal pain that started tonight and woke her up from sleep.  The patient currently rates her pain at a 3/10 on the pain scale and describes it as a \"bubble.\"  On physical exam she has negative obturators, Rovsing's, psoas sign, tenderness to palpation in the left upper quadrant, mild distention.  She denies any nausea, vomiting, diarrhea, constipation.  Patient states that she had pizza for dinner and flaming hot Cheetos prior to bed.  Will offer IV Protonix 40 mg possible reflux.    Patient denies any history of medical problems other than hyperlipidemia.  She was specifically asked about personal or family history of Crohn's disease or ulcerative colitis due to CT scan findings and she denied any history of either.  Also denies history of GERD. Social history is negative for tobacco use of any kind, vaping, alcohol use, and recreational drug use.  Patient denies any surgical history other than general surgery and heart catheterization for valvular disorder as a child.  Patient states that she only takes a multivitamin, vitamin D3 1000 IU daily, and fish oil.    In the ED " CT scan showed appendicoliths present mild dilation of the tip of the appendix to 9 mm-possible early/developing acute appendicitis, wall thickening of the ascending colon suggestive of colitis, hepatic lesions, increased soft tissue density at the umbilicus.  Relevant labs show normal white count of 9.2, lactate 1.0, amylase 59.  Urine culture pending.  Patient will be admitted to the general surgery service to be evaluated for developing acute appendicitis and wall thickening of the ascending colon.    Plan:    Possible acute appendicitis  - Hold antibiotics for now  - Pain control  - N.p.o.  - LR at 100 an hour for IVF    Possible GERD  -Protonix 40 mg IV daily           Further recommendations per Dr. Montoya    Time spent  46  minutes obtaining labs, imaging, recommendations, interview, assessment, examination, medication review/ordering, and EMR review.    Plan of care was discussed extensively with patient. Patient verbalized understanding through teach back method. All questions and concerns addressed upon examination.     Of note, this documentation is completed using the Dragon Dictation system (voice recognition software). There may be spelling and/or grammatical errors that were not corrected prior to final submission.    Electronically signed by MARQUES Low on 2/4/2024 at 6:46 AM

## 2024-02-05 VITALS
HEART RATE: 81 BPM | WEIGHT: 175 LBS | OXYGEN SATURATION: 99 % | HEIGHT: 66 IN | SYSTOLIC BLOOD PRESSURE: 112 MMHG | BODY MASS INDEX: 28.12 KG/M2 | TEMPERATURE: 97.3 F | RESPIRATION RATE: 18 BRPM | DIASTOLIC BLOOD PRESSURE: 80 MMHG

## 2024-02-05 LAB
ANION GAP SERPL CALC-SCNC: 10 MMOL/L (ref 10–20)
BACTERIA UR CULT: NORMAL
BUN SERPL-MCNC: 4 MG/DL (ref 6–23)
CALCIUM SERPL-MCNC: 9 MG/DL (ref 8.6–10.3)
CHLORIDE SERPL-SCNC: 108 MMOL/L (ref 98–107)
CO2 SERPL-SCNC: 23 MMOL/L (ref 21–32)
CREAT SERPL-MCNC: 0.64 MG/DL (ref 0.5–1.05)
EGFRCR SERPLBLD CKD-EPI 2021: >90 ML/MIN/1.73M*2
ERYTHROCYTE [DISTWIDTH] IN BLOOD BY AUTOMATED COUNT: 12.6 % (ref 11.5–14.5)
GLUCOSE SERPL-MCNC: 85 MG/DL (ref 74–99)
HCT VFR BLD AUTO: 33.9 % (ref 36–46)
HGB BLD-MCNC: 11.6 G/DL (ref 12–16)
HOLD SPECIMEN: NORMAL
MCH RBC QN AUTO: 31.1 PG (ref 26–34)
MCHC RBC AUTO-ENTMCNC: 34.2 G/DL (ref 32–36)
MCV RBC AUTO: 91 FL (ref 80–100)
NRBC BLD-RTO: 0 /100 WBCS (ref 0–0)
PLATELET # BLD AUTO: 310 X10*3/UL (ref 150–450)
POTASSIUM SERPL-SCNC: 3.6 MMOL/L (ref 3.5–5.3)
RBC # BLD AUTO: 3.73 X10*6/UL (ref 4–5.2)
SODIUM SERPL-SCNC: 137 MMOL/L (ref 136–145)
WBC # BLD AUTO: 5.8 X10*3/UL (ref 4.4–11.3)

## 2024-02-05 PROCEDURE — 96366 THER/PROPH/DIAG IV INF ADDON: CPT

## 2024-02-05 PROCEDURE — 96361 HYDRATE IV INFUSION ADD-ON: CPT

## 2024-02-05 PROCEDURE — 2500000004 HC RX 250 GENERAL PHARMACY W/ HCPCS (ALT 636 FOR OP/ED)

## 2024-02-05 PROCEDURE — 80048 BASIC METABOLIC PNL TOTAL CA: CPT

## 2024-02-05 PROCEDURE — 2500000001 HC RX 250 WO HCPCS SELF ADMINISTERED DRUGS (ALT 637 FOR MEDICARE OP): Performed by: STUDENT IN AN ORGANIZED HEALTH CARE EDUCATION/TRAINING PROGRAM

## 2024-02-05 PROCEDURE — 2500000004 HC RX 250 GENERAL PHARMACY W/ HCPCS (ALT 636 FOR OP/ED): Performed by: STUDENT IN AN ORGANIZED HEALTH CARE EDUCATION/TRAINING PROGRAM

## 2024-02-05 PROCEDURE — 36415 COLL VENOUS BLD VENIPUNCTURE: CPT

## 2024-02-05 PROCEDURE — 99222 1ST HOSP IP/OBS MODERATE 55: CPT | Performed by: STUDENT IN AN ORGANIZED HEALTH CARE EDUCATION/TRAINING PROGRAM

## 2024-02-05 PROCEDURE — 85027 COMPLETE CBC AUTOMATED: CPT

## 2024-02-05 PROCEDURE — G0378 HOSPITAL OBSERVATION PER HR: HCPCS

## 2024-02-05 PROCEDURE — 99231 SBSQ HOSP IP/OBS SF/LOW 25: CPT

## 2024-02-05 RX ORDER — POLYETHYLENE GLYCOL 3350 17 G/17G
17 POWDER, FOR SOLUTION ORAL 2 TIMES DAILY
Qty: 60 PACKET | Refills: 0 | Status: SHIPPED | OUTPATIENT
Start: 2024-02-05 | End: 2024-03-05 | Stop reason: ALTCHOICE

## 2024-02-05 RX ORDER — PANTOPRAZOLE SODIUM 40 MG/1
40 TABLET, DELAYED RELEASE ORAL
Qty: 30 TABLET | Refills: 0 | Status: SHIPPED | OUTPATIENT
Start: 2024-02-06 | End: 2024-03-05 | Stop reason: ALTCHOICE

## 2024-02-05 RX ORDER — AMOXICILLIN 250 MG
2 CAPSULE ORAL 2 TIMES DAILY
Qty: 120 TABLET | Refills: 0 | Status: SHIPPED | OUTPATIENT
Start: 2024-02-05 | End: 2024-03-05 | Stop reason: ALTCHOICE

## 2024-02-05 RX ORDER — AMOXICILLIN 250 MG
2 CAPSULE ORAL 2 TIMES DAILY
Status: DISCONTINUED | OUTPATIENT
Start: 2024-02-05 | End: 2024-02-05 | Stop reason: HOSPADM

## 2024-02-05 RX ORDER — SIMETHICONE 80 MG
80 TABLET,CHEWABLE ORAL 4 TIMES DAILY
Status: DISCONTINUED | OUTPATIENT
Start: 2024-02-05 | End: 2024-02-05 | Stop reason: HOSPADM

## 2024-02-05 RX ORDER — POLYETHYLENE GLYCOL 3350 17 G/17G
17 POWDER, FOR SOLUTION ORAL 2 TIMES DAILY
Status: DISCONTINUED | OUTPATIENT
Start: 2024-02-05 | End: 2024-02-05 | Stop reason: HOSPADM

## 2024-02-05 RX ADMIN — POLYETHYLENE GLYCOL 3350 17 G: 17 POWDER, FOR SOLUTION ORAL at 08:54

## 2024-02-05 RX ADMIN — PANTOPRAZOLE SODIUM 40 MG: 40 TABLET, DELAYED RELEASE ORAL at 08:06

## 2024-02-05 RX ADMIN — SODIUM CHLORIDE 10 ML: 9 INJECTION, SOLUTION INTRAVENOUS at 08:55

## 2024-02-05 RX ADMIN — SIMETHICONE 80 MG: 80 TABLET, CHEWABLE ORAL at 08:06

## 2024-02-05 RX ADMIN — DOCUSATE SODIUM 50MG AND SENNOSIDES 8.6MG 2 TABLET: 8.6; 5 TABLET, FILM COATED ORAL at 08:53

## 2024-02-05 RX ADMIN — SODIUM CHLORIDE 10 ML: 9 INJECTION, SOLUTION INTRAVENOUS at 00:00

## 2024-02-05 RX ADMIN — PIPERACILLIN SODIUM AND TAZOBACTAM SODIUM 3.38 G: 3; .375 INJECTION, SOLUTION INTRAVENOUS at 05:06

## 2024-02-05 ASSESSMENT — COGNITIVE AND FUNCTIONAL STATUS - GENERAL
DAILY ACTIVITIY SCORE: 24
MOBILITY SCORE: 24
MOBILITY SCORE: 24
DAILY ACTIVITIY SCORE: 24
MOBILITY SCORE: 24
MOBILITY SCORE: 24

## 2024-02-05 ASSESSMENT — PAIN - FUNCTIONAL ASSESSMENT
PAIN_FUNCTIONAL_ASSESSMENT: 0-10
PAIN_FUNCTIONAL_ASSESSMENT: 0-10

## 2024-02-05 ASSESSMENT — PAIN SCALES - GENERAL
PAINLEVEL_OUTOF10: 0 - NO PAIN
PAINLEVEL_OUTOF10: 1

## 2024-02-05 ASSESSMENT — PAIN DESCRIPTION - DESCRIPTORS: DESCRIPTORS: BURNING;DULL

## 2024-02-05 NOTE — DISCHARGE INSTRUCTIONS
Call the office or go to the ER if:  You have a fever above 100.4  Cannot eat or drink  Have trouble breathing  Have chest pain or shortness of breath  Pain is uncontrolled    Follow the anti-reflux diet as discussed with GI.  Continue the bowel regimen for constipation.

## 2024-02-05 NOTE — CARE PLAN
The patient's goals for the shift include      The clinical goals for the shift include No ABD pain and constipation relieved.

## 2024-02-05 NOTE — DISCHARGE SUMMARY
Discharge Diagnosis  Right lower quadrant abdominal pain    Issues Requiring Follow-Up  Follow up with GI in 2 weeks to discuss outpatient EGD and colonoscopy as well as outpatient MRI for possible liver lesions on CT    Test Results Pending At Discharge  Pending Labs       No current pending labs.            Hospital Course   23-year-old female presented to the ER on 2/4/2024 with mid epigastric pain that woke her up from sleep.  CT scan showed appendicoliths with mild dilation of the tip of the appendix-possible early/developing acute appendicitis, wall thickening of the ascending colon suggestive of colitis, hepatic lesions.  WBC was normal.  She was initially made n.p.o. and started on IV Protonix.  Later that day, she appeared to have mostly upper GI symptoms and benign examination of RLQ, so was started on a diet.  GI was consulted to see the patient and arranged for follow-up with GI to discuss outpatient EGD and colonoscopy as well as imaging for hepatic lesions, recommended discharge on PPI 40 mg daily, bowel regimen, and antireflux diet.  These interventions were initiated and on 2/5/2024 the patient had improved abdominal pain with no nausea or vomiting.  She was tolerating a regular diet well.  She was discharged in stable condition.  Return precautions were given including worsening abdominal pain, fevers, being unable to eat or drink.    Pertinent Physical Exam At Time of Discharge  Physical Exam  Constitutional:       General: She is not in acute distress.  HENT:      Head: Normocephalic and atraumatic.      Mouth/Throat:      Mouth: Mucous membranes are moist.   Eyes:      Conjunctiva/sclera: Conjunctivae normal.   Cardiovascular:      Rate and Rhythm: Normal rate and regular rhythm.   Pulmonary:      Effort: Pulmonary effort is normal. No respiratory distress.      Breath sounds: Normal breath sounds.   Abdominal:      General: Abdomen is flat.      Palpations: Abdomen is soft.      Tenderness:  There is abdominal tenderness (LLQ and RLQ, mild). There is no guarding or rebound.   Musculoskeletal:         General: No swelling.   Skin:     General: Skin is warm and dry.      Capillary Refill: Capillary refill takes less than 2 seconds.   Neurological:      Mental Status: She is alert.   Psychiatric:         Mood and Affect: Mood normal.         Behavior: Behavior normal.         Home Medications     Medication List      START taking these medications     pantoprazole 40 mg EC tablet; Commonly known as: ProtoNix; Take 1 tablet   (40 mg) by mouth once daily in the morning. Take before meals. Do not   crush, chew, or split. Do not start before February 6, 2024.; Start taking   on: February 6, 2024   polyethylene glycol 17 gram packet; Commonly known as: Glycolax,   Miralax; Take 17 g by mouth 2 times a day.   sennosides-docusate sodium 8.6-50 mg tablet; Commonly known as:   Brit-Colace; Take 2 tablets by mouth 2 times a day.     CONTINUE taking these medications     albuterol 90 mcg/actuation inhaler   drospirenone-ethinyl estradioL 3-0.02 mg tablet; Commonly known as: Cleopatra   (28); Take 1 tablet by mouth once daily.   EPINEPHrine 0.3 mg/0.3 mL injection syringe; Commonly known as: Epipen   fluticasone 50 mcg/actuation nasal spray; Commonly known as: Flonase     STOP taking these medications     clobetasol 0.05 % cream; Commonly known as: Temovate       Outpatient Follow-Up  No future appointments.    Liya Feng PA-C

## 2024-02-05 NOTE — CONSULTS
Department of Internal Medicine  Gastroenterology  Consult note      Reason for Consult: epigastric/LUQ pain    Chief Complaint: abdominal pain    History of Present Illness      The patient is a 23 y.o. female with signficant past medical history of asthma and high cholesterol who presents for  abdominal pain.    Patient reports intermittent nausea/vomiting that started last year occurring a couple times a week with a couple hours in duration before subsiding on its own.  Patient thinks she took Gas-X over-the-counter, with no relief.  Has not seen a doctor for the symptoms.  Patient has not had abdominal pain until recently when she woke up with burning diffuse abdominal pain worse to the left upper quadrant that started 2 days ago.  Patient took Pepto and an antacid with no relief.  Patient reports eating 2 pieces of pizza and hot fries the night prior to symptoms.  Patient also reports intermittent constipation occurring every other day over the last year.  Patient reports being constipated with no bowel movement over the past few days with straining to have a small bowel movement yesterday.  Patient has not tried medication over-the-counter.  Patient describes stool as solid/noris with mucus and occasional streaks of blood after straining.  Most recent episode of blood in stool was the end of last year, none recent.    Patient denies a history of diabetes.  No regular EtOH or marijuana use reported.  No regular NSAID use.  No blood thinners at home.  No unintentional weight loss.  No dysphagia or odynophagia.  Patient denies hematemesis or melena.  No diarrhea.  No family history of CRC or IBD.    Patient reports overall feeling better today only with mild intermittent abdominal burning.  Tolerating regular diet with no worsening pain or nausea/vomiting.  Patient reports most recently feeling nauseous about 1 to 2 weeks ago.    Allergies  Peanut oil, Banana, Other, Peanut, and Soy    Current  Medication    Current Facility-Administered Medications:     acetaminophen (Tylenol) tablet 650 mg, 650 mg, oral, q6h PRN, Poppy Brown PA-C    oxyCODONE (Roxicodone) immediate release tablet 10 mg, 10 mg, oral, q6h PRN, Poppy Brown PA-C    oxyCODONE (Roxicodone) immediate release tablet 5 mg, 5 mg, oral, q6h PRN, Poppy Brown PA-C    pantoprazole (ProtoNix) EC tablet 40 mg, 40 mg, oral, Daily before breakfast, Poppy Brown PA-C, 40 mg at 02/05/24 0806    piperacillin-tazobactam-dextrose (Zosyn) IV 3.375 g, 3.375 g, intravenous, q8h, Poppy Brown PA-C, Stopped at 02/05/24 0906    polyethylene glycol (Glycolax, Miralax) packet 17 g, 17 g, oral, BID, Kj David MD, 17 g at 02/05/24 0854    sennosides-docusate sodium (Brit-Colace) 8.6-50 mg per tablet 2 tablet, 2 tablet, oral, BID, Kj David MD, 2 tablet at 02/05/24 0853    simethicone (Mylicon) chewable tablet 80 mg, 80 mg, oral, 4x daily, Kj David MD, 80 mg at 02/05/24 0806    sodium chloride 0.9 % bolus 10 mL, 10 mL, intravenous, q8h, Linda Ca, PharmD, Stopped at 02/05/24 0955    Past Medical History  Active Ambulatory Problems     Diagnosis Date Noted    Skin rash 10/06/2023    Allergic rhinitis 05/21/2007    Anxiety disorder 10/18/2023    Asthma 10/27/2009    Hyperlipidemia 10/27/2009    Vitamin D deficiency 10/18/2023    Annual physical exam 10/20/2023    Cervical cancer screening 11/03/2023     Resolved Ambulatory Problems     Diagnosis Date Noted    BMI 30.0-30.9,adult 10/06/2023    Schizophreniform disorder (CMS/HCC) 10/06/2023     Past Medical History:   Diagnosis Date    Body mass index (BMI) 23.0-23.9, adult 02/10/2022    Body mass index (BMI) 25.0-25.9, adult 10/01/2021    Depression     Personal history of other diseases of the respiratory system 01/27/2022    Personal history of other infectious and parasitic diseases 08/17/2021    Personal history of other specified conditions 02/10/2022    Personal history of urinary  "(tract) infections 02/10/2022    Unspecified asthma with (acute) exacerbation 02/10/2022       Past Surgical History  Past Surgical History:   Procedure Laterality Date    CARDIAC SURGERY  02/10/2022    4 or 5 years old per patient.    MANDIBLE SURGERY  02/10/2022    in 2016 to align jaw after braces.       Family History  Family History   Problem Relation Name Age of Onset    Hyperlipidemia Mother      Cancer Maternal Grandmother          in remission.    Hyperlipidemia Maternal Grandfather      Cancer Maternal Grandfather          testicular     No family history of stomach or colon cancer.  No family history of IBD    Social History  TOBACCO:  reports that she has never smoked. She has never used smokeless tobacco.  ETOH:  reports that she does not currently use alcohol.  DRUGS:  reports that she does not currently use drugs.  MARITAL STATUS:   OCCUPATION:    Review of Systems  All 10 systems reviewed with the patient/family and negative except for what is mentioned in HPI      PHYSICAL EXAM  VS: /75 (BP Location: Left arm, Patient Position: Lying)   Pulse 79   Temp 36.5 °C (97.7 °F) (Temporal)   Resp 16   Ht 1.676 m (5' 6\")   Wt 79.4 kg (175 lb)   SpO2 97%   BMI 28.25 kg/m²  Body mass index is 28.25 kg/m².  Physical Exam  Vitals reviewed.   Constitutional:       General: She is not in acute distress.     Appearance: Normal appearance.   HENT:      Head: Normocephalic.      Nose: Nose normal.      Mouth/Throat:      Mouth: Mucous membranes are moist.      Pharynx: Oropharynx is clear.   Eyes:      Extraocular Movements: Extraocular movements intact.      Conjunctiva/sclera: Conjunctivae normal.      Pupils: Pupils are equal, round, and reactive to light.   Cardiovascular:      Rate and Rhythm: Normal rate and regular rhythm.      Pulses: Normal pulses.      Heart sounds: Normal heart sounds.   Pulmonary:      Effort: Pulmonary effort is normal.      Breath sounds: Normal breath sounds.   Abdominal: "      General: Bowel sounds are normal. There is no distension.      Palpations: Abdomen is soft.      Tenderness: There is abdominal tenderness. There is no guarding or rebound.      Comments: Mild LUQ tenderness    Musculoskeletal:         General: Normal range of motion.      Cervical back: Normal range of motion.   Skin:     General: Skin is warm and dry.   Neurological:      General: No focal deficit present.      Mental Status: She is alert and oriented to person, place, and time.   Psychiatric:         Mood and Affect: Mood normal.         Behavior: Behavior normal.          DATA  Recent blood work and relevant radiology and endoscopic studies were reviewed and discussed with the patient   Results from last 7 days   Lab Units 02/05/24  0643   WBC AUTO x10*3/uL 5.8   RBC AUTO x10*6/uL 3.73*   HEMOGLOBIN g/dL 11.6*   HEMATOCRIT % 33.9*   MCV fL 91   MCHC g/dL 34.2   RDW % 12.6   PLATELETS AUTO x10*3/uL 310       Results from last 72 hours   Lab Units 02/05/24  0643 02/04/24  0227   SODIUM mmol/L 137 136   POTASSIUM mmol/L 3.6 3.7   CHLORIDE mmol/L 108* 106   CO2 mmol/L 23 23   BUN mg/dL 4* 8   CREATININE mg/dL 0.64 0.63   CALCIUM mg/dL 9.0 9.0   PROTEIN TOTAL g/dL  --  7.3   BILIRUBIN TOTAL mg/dL  --  0.2   ALK PHOS U/L  --  51   AST U/L  --  12   ALT U/L  --  9             Results from last 72 hours   Lab Units 02/04/24  0227   LIPASE U/L 17       RADIOLOGY REVIEW  CT A/P 2/4/2024:  IMPRESSION:  1.  Wall thickening at the ascending colon, suggestive of colitis.  Follow-up colonoscopy after treatment recommended to exclude  underlying neoplasm.  2. Constipation with moderate amount of stool at the colon.  3. Appendicoliths are present at the appendix. Mild dilation at the  tip of the appendix to 9 mm. Clinical correlation recommended to  exclude early/developing acute appendicitis.  4. Circumferential wall thickening of the urinary bladder, may be  secondary to underdistention. Please correlate with urinalysis  to  exclude superimposed cystitis.  5. Hepatic lesions. Nonemergent contrast-enhanced MRI can be obtained  for further characterization.  6. Increased soft tissue density at the umbilicus. Please correlate  with clinical exam.    ENDOSCOPIC REVIEW  No prior EGD or colonoscopy in the past      IMPRESSION/RECOMMENDATIONS  The patient is a 23 y.o. female with signficant past medical history of asthma and high cholesterol who presents for  abdominal pain.      #Abdominal pain prior to arrival and intermittent N/V since last year.  Abdominal pain after eating pizza and hot fries likely 2/2 acid reflux, but could consider gastritis or ulcer.  Abdominal pain improved tolerating regular diet no current nausea/vomiting  #Constipation with some streaks of bright red blood per rectum after straining.  No active GI bleeding with most recent bloody stool last year.  Probably 2/2 hemorrhoids.  No weight loss or family history of IBD.  Hemoglobin 11.6  #Abnormal CT - wall thick ascending colon, hepatic lesions, constipation.  LFTs WNL      Plan  -No indication for emergent/urgent endoscopic evaluation at this time  -Follow-up with GI to discuss outpatient EGD and colonoscopy as well as further imaging with outpatient MRI for hepatic lesions  -Recommend going home on PPI 40 mg daily  -Go home on bowel regimen  -Antireflux diet- discussed with the patient  -Continue supportive care              Plan discussed with Dr. David. GI will sign off  Total of 70 minutes spent on visit and overall professional care of the patient of which greater than 50% spent on patient/family education  (Electronically signed byMARQUES Liz on 2/5/2024 at 11:30 AM)     Inpatient consult to gastroenterology  Consult performed by: MARQUES Liz  Consult ordered by: Poppy Brown PA-C      Attending note patient seen and examined    24-year-old lady with history of asthma hyperlipidemia presenting with abdominal pain,    1-  Abdominal pain with imaging suggestive of constipation with ascending colon thickening see official report of CAT scan, will need long-term bowel regimen, Gas-X or Bentyl as needed, outpatient follow-up for colonoscopy    2-liver lesions on imaging, check MRI with and without contrast

## 2024-02-06 ENCOUNTER — TELEPHONE (OUTPATIENT)
Dept: PRIMARY CARE | Facility: CLINIC | Age: 24
End: 2024-02-06
Payer: COMMERCIAL

## 2024-02-06 DIAGNOSIS — R93.5 ABNORMAL CT OF THE ABDOMEN: ICD-10-CM

## 2024-02-06 DIAGNOSIS — R10.9 ABDOMINAL PAIN, UNSPECIFIED ABDOMINAL LOCATION: Primary | ICD-10-CM

## 2024-02-06 NOTE — TELEPHONE ENCOUNTER
Patient phones the office today after being seen at the [facility] ER on 2-4-24 for Abdominal pain.     Patient was seen by Dr. Rusty Boone as consult who advised patient to contact [facility] for referral to be seen by Gastroenterology for EGD/Colonoscopy.     Please place referral for scheduling purposes.     Thank you.

## 2024-02-09 ENCOUNTER — APPOINTMENT (OUTPATIENT)
Dept: RADIOLOGY | Facility: HOSPITAL | Age: 24
End: 2024-02-09
Payer: COMMERCIAL

## 2024-02-09 ENCOUNTER — APPOINTMENT (OUTPATIENT)
Dept: CARDIOLOGY | Facility: HOSPITAL | Age: 24
End: 2024-02-09
Payer: COMMERCIAL

## 2024-02-09 ENCOUNTER — HOSPITAL ENCOUNTER (EMERGENCY)
Facility: HOSPITAL | Age: 24
Discharge: HOME | End: 2024-02-09
Attending: EMERGENCY MEDICINE
Payer: COMMERCIAL

## 2024-02-09 VITALS
DIASTOLIC BLOOD PRESSURE: 72 MMHG | HEIGHT: 66 IN | OXYGEN SATURATION: 99 % | RESPIRATION RATE: 18 BRPM | SYSTOLIC BLOOD PRESSURE: 112 MMHG | BODY MASS INDEX: 27.46 KG/M2 | WEIGHT: 170.86 LBS | HEART RATE: 88 BPM

## 2024-02-09 DIAGNOSIS — R20.2 PARESTHESIA OF LEFT UPPER EXTREMITY: Primary | ICD-10-CM

## 2024-02-09 LAB
ALBUMIN SERPL BCP-MCNC: 4.1 G/DL (ref 3.4–5)
ALP SERPL-CCNC: 54 U/L (ref 33–110)
ALT SERPL W P-5'-P-CCNC: 9 U/L (ref 7–45)
AMPHETAMINES UR QL SCN: NORMAL
ANION GAP SERPL CALC-SCNC: 12 MMOL/L (ref 10–20)
APPEARANCE UR: ABNORMAL
AST SERPL W P-5'-P-CCNC: 14 U/L (ref 9–39)
ATRIAL RATE: 68 BPM
ATRIAL RATE: 74 BPM
B-HCG SERPL-ACNC: <2 MIU/ML
BARBITURATES UR QL SCN: NORMAL
BASOPHILS # BLD AUTO: 0.03 X10*3/UL (ref 0–0.1)
BASOPHILS NFR BLD AUTO: 0.6 %
BENZODIAZ UR QL SCN: NORMAL
BILIRUB SERPL-MCNC: 0.3 MG/DL (ref 0–1.2)
BILIRUB UR STRIP.AUTO-MCNC: NEGATIVE MG/DL
BUN SERPL-MCNC: 5 MG/DL (ref 6–23)
BZE UR QL SCN: NORMAL
CALCIUM SERPL-MCNC: 9.6 MG/DL (ref 8.6–10.3)
CANNABINOIDS UR QL SCN: NORMAL
CARDIAC TROPONIN I PNL SERPL HS: <3 NG/L (ref 0–13)
CARDIAC TROPONIN I PNL SERPL HS: <3 NG/L (ref 0–13)
CHLORIDE SERPL-SCNC: 106 MMOL/L (ref 98–107)
CO2 SERPL-SCNC: 24 MMOL/L (ref 21–32)
COLOR UR: YELLOW
CREAT SERPL-MCNC: 0.6 MG/DL (ref 0.5–1.05)
EGFRCR SERPLBLD CKD-EPI 2021: >90 ML/MIN/1.73M*2
EOSINOPHIL # BLD AUTO: 0.12 X10*3/UL (ref 0–0.7)
EOSINOPHIL NFR BLD AUTO: 2.2 %
ERYTHROCYTE [DISTWIDTH] IN BLOOD BY AUTOMATED COUNT: 12.9 % (ref 11.5–14.5)
FENTANYL+NORFENTANYL UR QL SCN: NORMAL
FLUAV RNA RESP QL NAA+PROBE: NOT DETECTED
FLUBV RNA RESP QL NAA+PROBE: NOT DETECTED
GLUCOSE SERPL-MCNC: 80 MG/DL (ref 74–99)
GLUCOSE UR STRIP.AUTO-MCNC: NEGATIVE MG/DL
HCT VFR BLD AUTO: 35.8 % (ref 36–46)
HGB BLD-MCNC: 12.1 G/DL (ref 12–16)
HOLD SPECIMEN: NORMAL
IMM GRANULOCYTES # BLD AUTO: 0.01 X10*3/UL (ref 0–0.7)
IMM GRANULOCYTES NFR BLD AUTO: 0.2 % (ref 0–0.9)
KETONES UR STRIP.AUTO-MCNC: NEGATIVE MG/DL
LACTATE SERPL-SCNC: 0.7 MMOL/L (ref 0.4–2)
LEUKOCYTE ESTERASE UR QL STRIP.AUTO: ABNORMAL
LYMPHOCYTES # BLD AUTO: 1.49 X10*3/UL (ref 1.2–4.8)
LYMPHOCYTES NFR BLD AUTO: 27.4 %
MAGNESIUM SERPL-MCNC: 1.76 MG/DL (ref 1.6–2.4)
MCH RBC QN AUTO: 30.6 PG (ref 26–34)
MCHC RBC AUTO-ENTMCNC: 33.8 G/DL (ref 32–36)
MCV RBC AUTO: 90 FL (ref 80–100)
MONOCYTES # BLD AUTO: 0.4 X10*3/UL (ref 0.1–1)
MONOCYTES NFR BLD AUTO: 7.4 %
MUCOUS THREADS #/AREA URNS AUTO: ABNORMAL /LPF
NEUTROPHILS # BLD AUTO: 3.38 X10*3/UL (ref 1.2–7.7)
NEUTROPHILS NFR BLD AUTO: 62.2 %
NITRITE UR QL STRIP.AUTO: NEGATIVE
NRBC BLD-RTO: 0 /100 WBCS (ref 0–0)
OPIATES UR QL SCN: NORMAL
OXYCODONE+OXYMORPHONE UR QL SCN: NORMAL
P AXIS: 42 DEGREES
P AXIS: 53 DEGREES
P OFFSET: 161 MS
P OFFSET: 167 MS
P ONSET: 116 MS
P ONSET: 122 MS
PCP UR QL SCN: NORMAL
PH UR STRIP.AUTO: 8 [PH]
PLATELET # BLD AUTO: 347 X10*3/UL (ref 150–450)
POTASSIUM SERPL-SCNC: 3.8 MMOL/L (ref 3.5–5.3)
PR INTERVAL: 196 MS
PR INTERVAL: 206 MS
PROT SERPL-MCNC: 7.6 G/DL (ref 6.4–8.2)
PROT UR STRIP.AUTO-MCNC: NEGATIVE MG/DL
Q ONSET: 219 MS
Q ONSET: 220 MS
QRS COUNT: 11 BEATS
QRS COUNT: 12 BEATS
QRS DURATION: 82 MS
QRS DURATION: 82 MS
QT INTERVAL: 388 MS
QT INTERVAL: 388 MS
QTC CALCULATION(BAZETT): 412 MS
QTC CALCULATION(BAZETT): 430 MS
QTC FREDERICIA: 404 MS
QTC FREDERICIA: 416 MS
R AXIS: 39 DEGREES
R AXIS: 47 DEGREES
RBC # BLD AUTO: 3.96 X10*6/UL (ref 4–5.2)
RBC # UR STRIP.AUTO: ABNORMAL /UL
RBC #/AREA URNS AUTO: >20 /HPF
SARS-COV-2 RNA RESP QL NAA+PROBE: NOT DETECTED
SODIUM SERPL-SCNC: 138 MMOL/L (ref 136–145)
SP GR UR STRIP.AUTO: 1.01
SQUAMOUS #/AREA URNS AUTO: ABNORMAL /HPF
T AXIS: -4 DEGREES
T AXIS: 0 DEGREES
T OFFSET: 413 MS
T OFFSET: 414 MS
UROBILINOGEN UR STRIP.AUTO-MCNC: <2 MG/DL
VENTRICULAR RATE: 68 BPM
VENTRICULAR RATE: 74 BPM
WBC # BLD AUTO: 5.4 X10*3/UL (ref 4.4–11.3)
WBC #/AREA URNS AUTO: ABNORMAL /HPF

## 2024-02-09 PROCEDURE — 36415 COLL VENOUS BLD VENIPUNCTURE: CPT | Performed by: PHYSICIAN ASSISTANT

## 2024-02-09 PROCEDURE — 84702 CHORIONIC GONADOTROPIN TEST: CPT | Performed by: PHYSICIAN ASSISTANT

## 2024-02-09 PROCEDURE — 99284 EMERGENCY DEPT VISIT MOD MDM: CPT | Mod: 25 | Performed by: EMERGENCY MEDICINE

## 2024-02-09 PROCEDURE — 87086 URINE CULTURE/COLONY COUNT: CPT | Mod: ELYLAB | Performed by: PHYSICIAN ASSISTANT

## 2024-02-09 PROCEDURE — 83735 ASSAY OF MAGNESIUM: CPT | Performed by: PHYSICIAN ASSISTANT

## 2024-02-09 PROCEDURE — 71045 X-RAY EXAM CHEST 1 VIEW: CPT

## 2024-02-09 PROCEDURE — 93005 ELECTROCARDIOGRAM TRACING: CPT

## 2024-02-09 PROCEDURE — 80307 DRUG TEST PRSMV CHEM ANLYZR: CPT | Performed by: PHYSICIAN ASSISTANT

## 2024-02-09 PROCEDURE — 85025 COMPLETE CBC W/AUTO DIFF WBC: CPT | Performed by: PHYSICIAN ASSISTANT

## 2024-02-09 PROCEDURE — 83605 ASSAY OF LACTIC ACID: CPT | Performed by: PHYSICIAN ASSISTANT

## 2024-02-09 PROCEDURE — 70450 CT HEAD/BRAIN W/O DYE: CPT

## 2024-02-09 PROCEDURE — 99284 EMERGENCY DEPT VISIT MOD MDM: CPT | Mod: 25

## 2024-02-09 PROCEDURE — 87636 SARSCOV2 & INF A&B AMP PRB: CPT | Performed by: PHYSICIAN ASSISTANT

## 2024-02-09 PROCEDURE — 81001 URINALYSIS AUTO W/SCOPE: CPT | Mod: 59 | Performed by: PHYSICIAN ASSISTANT

## 2024-02-09 PROCEDURE — 71045 X-RAY EXAM CHEST 1 VIEW: CPT | Performed by: RADIOLOGY

## 2024-02-09 PROCEDURE — 84484 ASSAY OF TROPONIN QUANT: CPT | Performed by: PHYSICIAN ASSISTANT

## 2024-02-09 PROCEDURE — 84075 ASSAY ALKALINE PHOSPHATASE: CPT | Performed by: PHYSICIAN ASSISTANT

## 2024-02-09 PROCEDURE — 70450 CT HEAD/BRAIN W/O DYE: CPT | Performed by: RADIOLOGY

## 2024-02-09 ASSESSMENT — PAIN SCALES - GENERAL
PAINLEVEL_OUTOF10: 0 - NO PAIN
PAINLEVEL_OUTOF10: 0 - NO PAIN
PAINLEVEL_OUTOF10: 3

## 2024-02-09 ASSESSMENT — LIFESTYLE VARIABLES
HAVE YOU EVER FELT YOU SHOULD CUT DOWN ON YOUR DRINKING: NO
EVER FELT BAD OR GUILTY ABOUT YOUR DRINKING: NO
EVER HAD A DRINK FIRST THING IN THE MORNING TO STEADY YOUR NERVES TO GET RID OF A HANGOVER: NO
HAVE PEOPLE ANNOYED YOU BY CRITICIZING YOUR DRINKING: NO

## 2024-02-09 ASSESSMENT — PAIN - FUNCTIONAL ASSESSMENT: PAIN_FUNCTIONAL_ASSESSMENT: 0-10

## 2024-02-09 NOTE — Clinical Note
Sheila Pollock was seen and treated in our emergency department on 2/9/2024.  She may return to work on 02/10/2024.       If you have any questions or concerns, please don't hesitate to call.      Pelon Lara PA-C

## 2024-02-09 NOTE — ED PROVIDER NOTES
"HPI   Chief Complaint   Patient presents with    Numbness     'had numbness in the left arm this morning.  I had anxiety and I was having a hard time speaking.  Still having the numbness and this all started at 0800 about.\"       A 23-year-old female comes in the emergency department today with complaints of feeling little bit shaky as well as tingling in her left arm.  States this started around little before 8 AM this morning.  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  They sent her home from work for the day her boss drove her home.  States she continues to have this tingling in her left arm.  Denies any associated chest pain, shortness of breath, fevers, chills, cough.  She does describe that she has been on medication has been having severe diarrhea.  Roughly 5 episodes a day.  Denies any abdominal pain with this.  For this purpose comes in the emergency department today for further evaluation.  Otherwise no other complaints this present time.                          Jim Coma Scale Score: 15         NIH Stroke Scale: 1             Patient History   Past Medical History:   Diagnosis Date    Body mass index (BMI) 23.0-23.9, adult 02/10/2022    Body mass index (BMI) of 23.0 to 23.9 in adult    Body mass index (BMI) 25.0-25.9, adult 10/01/2021    Body mass index (BMI) of 25.0 to 25.9 in adult    Depression     Hyperlipidemia     Personal history of other diseases of the respiratory system 01/27/2022    History of acute pharyngitis    Personal history of other infectious and parasitic diseases 08/17/2021    History of candidiasis of vagina    Personal history of other specified conditions 02/10/2022    History of chest pain    Personal history of urinary (tract) infections 02/10/2022    History of urinary tract infection    Unspecified asthma with (acute) exacerbation 02/10/2022    Asthma exacerbation     Past " Surgical History:   Procedure Laterality Date    CARDIAC SURGERY  02/10/2022    4 or 5 years old per patient.    MANDIBLE SURGERY  02/10/2022    in 2016 to align jaw after braces.     Family History   Problem Relation Name Age of Onset    Hyperlipidemia Mother      Cancer Maternal Grandmother          in remission.    Hyperlipidemia Maternal Grandfather      Cancer Maternal Grandfather          testicular     Social History     Tobacco Use    Smoking status: Never    Smokeless tobacco: Never   Vaping Use    Vaping Use: Never used   Substance Use Topics    Alcohol use: Never    Drug use: Never       Physical Exam   ED Triage Vitals [02/09/24 1021]   Temp Pulse Respirations BP   -- -- 16 --      SpO2 Temp Source Heart Rate Source Patient Position   -- Temporal Monitor Sitting      BP Location FiO2 (%)     Right arm --       Physical Exam  Constitutional:       General: She is in acute distress (Moderate distress, anxiety).      Appearance: Normal appearance. She is not ill-appearing or diaphoretic.   HENT:      Head: Normocephalic and atraumatic.      Nose: Nose normal.   Eyes:      Extraocular Movements: Extraocular movements intact.      Conjunctiva/sclera: Conjunctivae normal.      Pupils: Pupils are equal, round, and reactive to light.   Cardiovascular:      Rate and Rhythm: Normal rate and regular rhythm.   Pulmonary:      Effort: Pulmonary effort is normal. No respiratory distress.      Breath sounds: Normal breath sounds. No stridor. No wheezing.   Abdominal:      General: Abdomen is flat.   Musculoskeletal:         General: Normal range of motion.      Cervical back: Normal range of motion.   Skin:     General: Skin is warm and dry.   Neurological:      General: No focal deficit present.      Mental Status: She is alert and oriented to person, place, and time. Mental status is at baseline.      Comments: NIH: 1 for sensory change in the left arm   Psychiatric:      Comments: Anxious         ED Course & MDM    Diagnoses as of 02/09/24 1347   Paresthesia of left upper extremity       Medical Decision Making  A 23-year-old female comes in the emergency department today with complaints of feeling little bit shaky as well as tingling in her left arm.  States this started around little before 8 AM this morning.  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  They sent her home from work for the day her boss drove her home.  States she continues to have this tingling in her left arm.  Denies any associated chest pain, shortness of breath, fevers, chills, cough.  She does describe that she has been on medication has been having severe diarrhea.  Roughly 5 episodes a day.  Denies any abdominal pain with this.  For this purpose comes in the emergency department today for further evaluation.  Otherwise no other complaints this present time.    CT study of the head, EKG, laboratory studies are ordered to rule out acute intracranial abnormality including brain mass, brain edema, electrolyte abnormalities, leukocytosis, EKG rule out ACS, arrhythmias.    Patient has negative troponin is negative for influenza COVID-19.  Negative for pregnancy.  Negative EKG findings negative CMP with normal renal function normal electrolytes.  Patient magnesium normal lactate normal CBC shows no elevated white blood cell count or otherwise.  Patient's urinalysis is negative for infection that does discuss blood in the urine but patient is currently on her menstrual cycle.  Patient magnesium normal lactate normal CBC shows no elevated white blood cell count or otherwise.  Patient's urinalysis is negative for infection that does discuss blood in the urine but patient is currently on her menstrual cycle.  Chest x-ray negative as well as CT study of the head.    I reevaluate the patient.  Patient's symptoms have resolved.  I did discuss neurology follow-up.   Patient agrees with this plan expressed full verbal understanding.  All questions were answered.    Historians patient    Diagnosis: Upper extremity paresthesia        Labs Reviewed   CBC WITH AUTO DIFFERENTIAL - Abnormal       Result Value    WBC 5.4      nRBC 0.0      RBC 3.96 (*)     Hemoglobin 12.1      Hematocrit 35.8 (*)     MCV 90      MCH 30.6      MCHC 33.8      RDW 12.9      Platelets 347      Neutrophils % 62.2      Immature Granulocytes %, Automated 0.2      Lymphocytes % 27.4      Monocytes % 7.4      Eosinophils % 2.2      Basophils % 0.6      Neutrophils Absolute 3.38      Immature Granulocytes Absolute, Automated 0.01      Lymphocytes Absolute 1.49      Monocytes Absolute 0.40      Eosinophils Absolute 0.12      Basophils Absolute 0.03     COMPREHENSIVE METABOLIC PANEL - Abnormal    Glucose 80      Sodium 138      Potassium 3.8      Chloride 106      Bicarbonate 24      Anion Gap 12      Urea Nitrogen 5 (*)     Creatinine 0.60      eGFR >90      Calcium 9.6      Albumin 4.1      Alkaline Phosphatase 54      Total Protein 7.6      AST 14      Bilirubin, Total 0.3      ALT 9     URINALYSIS WITH REFLEX CULTURE AND MICROSCOPIC - Abnormal    Color, Urine Yellow      Appearance, Urine Hazy (*)     Specific Gravity, Urine 1.009      pH, Urine 8.0      Protein, Urine NEGATIVE      Glucose, Urine NEGATIVE      Blood, Urine LARGE (3+) (*)     Ketones, Urine NEGATIVE      Bilirubin, Urine NEGATIVE      Urobilinogen, Urine <2.0      Nitrite, Urine NEGATIVE      Leukocyte Esterase, Urine TRACE (*)    MICROSCOPIC ONLY, URINE - Abnormal    WBC, Urine NONE      RBC, Urine >20 (*)     Squamous Epithelial Cells, Urine 1-9 (SPARSE)      Mucus, Urine 1+     MAGNESIUM - Normal    Magnesium 1.76     LACTATE - Normal    Lactate 0.7      Narrative:     Venipuncture immediately after or during the administration of Metamizole may lead to falsely low results. Testing should be performed immediately  prior to Metamizole  dosing.   HUMAN CHORIONIC GONADOTROPIN, SERUM QUANTITATIVE - Normal    HCG, Beta-Quantitative <2      Narrative:      Total HCG measurement is performed using the [employer] Access   Immunoassay which detects intact HCG and free beta HCG subunit.    This test is not indicated for use as a tumor marker.   HCG testing is performed using a different test methodology at [facility] than other [facility]. Direct result comparison   should only be made within the same method.       SARS-COV-2 AND INFLUENZA A/B PCR - Normal    Flu A Result Not Detected      Flu B Result Not Detected      Coronavirus 2019, PCR Not Detected      Narrative:     This assay has received FDA Emergency Use Authorization (EUA) and  is only authorized for the duration of time that circumstances exist to justify the authorization of the emergency use of in vitro diagnostic tests for the detection of SARS-CoV-2 virus and/or diagnosis of COVID-19 infection under section 564(b)(1) of the Act, 21 U.S.C. 360bbb-3(b)(1). Testing for SARS-CoV-2 is only recommended for patients who meet current clinical and/or epidemiological criteria as defined by federal, state, or local public health directives. This assay is an in vitro diagnostic nucleic acid amplification test for the qualitative detection of SARS-CoV-2, Influenza A, and Influenza B from nasopharyngeal specimens and has been validated for use at [facility]. Negative results do not preclude COVID-19 infections or Influenza A/B infections, and should not be used as the sole basis for diagnosis, treatment, or other management decisions. If Influenza A/B and RSV PCR results are negative, testing for Parainfluenza virus, Adenovirus and Metapneumovirus is routinely performed for [facility] [place] inpatients, and is available on other patients by placing an add-on request.    SERIAL TROPONIN-INITIAL - Normal    Troponin I, High  Sensitivity <3      Narrative:     Less than 99th percentile of normal range cutoff-  Female and children under 18 years old <14 ng/L; Male <21 ng/L: Negative  Repeat testing should be performed if clinically indicated.     Female and children under 18 years old 14-50 ng/L; Male 21-50 ng/L:  Consistent with possible cardiac damage and possible increased clinical   risk. Serial measurements may help to assess extent of myocardial damage.     >50 ng/L: Consistent with cardiac damage, increased clinical risk and  myocardial infarction. Serial measurements may help assess extent of   myocardial damage.      NOTE: Children less than 1 year old may have higher baseline troponin   levels and results should be interpreted in conjunction with the overall   clinical context.     NOTE: Troponin I testing is performed using a different   testing methodology at [facility] than at other   [facility]. Direct result comparisons should only   be made within the same method.   SERIAL TROPONIN, 1 HOUR - Normal    Troponin I, High Sensitivity <3      Narrative:     Less than 99th percentile of normal range cutoff-  Female and children under 18 years old <14 ng/L; Male <21 ng/L: Negative  Repeat testing should be performed if clinically indicated.     Female and children under 18 years old 14-50 ng/L; Male 21-50 ng/L:  Consistent with possible cardiac damage and possible increased clinical   risk. Serial measurements may help to assess extent of myocardial damage.     >50 ng/L: Consistent with cardiac damage, increased clinical risk and  myocardial infarction. Serial measurements may help assess extent of   myocardial damage.      NOTE: Children less than 1 year old may have higher baseline troponin   levels and results should be interpreted in conjunction with the overall   clinical context.     NOTE: Troponin I testing is performed using a different   testing methodology at [facility] than at other    [facility]. Direct result comparisons should only   be made within the same method.   C. DIFFICILE, PCR   STOOL PATHOGEN PANEL, PCR   URINE CULTURE   TROPONIN SERIES- (INITIAL, 1 HR)    Narrative:     The following orders were created for panel order Troponin I Series, High Sensitivity (0, 1 HR).  Procedure                               Abnormality         Status                     ---------                               -----------         ------                     Troponin I, High Sensiti...[[id number]]  Normal              Final result               Troponin, High Sensitivi...[[id number]]  Normal              Final result                 Please view results for these tests on the individual orders.   DRUG SCREEN, URINE WITH REFLEX TO CONFIRMATION   URINALYSIS WITH REFLEX CULTURE AND MICROSCOPIC    Narrative:     The following orders were created for panel order Urinalysis with Reflex Culture and Microscopic.  Procedure                               Abnormality         Status                     ---------                               -----------         ------                     Urinalysis with Reflex C...[[id number]]  Abnormal            Final result               Extra Urine Gray Tube[[id number]]                            In process                   Please view results for these tests on the individual orders.   EXTRA URINE GRAY TUBE        XR chest 1 view   Final Result   No acute cardiopulmonary process.        MACRO:   None        Signed by: Jackie Grace 2/9/2024 11:34 AM   Dictation workstation:   [id number]      CT head wo IV contrast   Final Result   Negative exam.        MACRO:   None        Signed by: Chris Milan 2/9/2024 11:21 AM   Dictation workstation:   [id number]                  Shared JONATHAN Attestation:    This patient was seen by the advanced practice provider.  I personally saw the patient and made/approved the management plan and take responsibility for the patient  management.    History: 23-year-old female presents with feeling shaky and some tingling in her left arm.  She has also been having some diarrhea.    Exam: Regular rate and rhythm cardiac exam with clear breath sounds bilaterally.  Abdomen is soft and nontender.  Neurological exam is grossly intact.  The patient does have some subjective tingling in her left arm.  Therefore her NIH stroke scale is a 1.  Negative Homans' sign bilaterally.    MDM: Stroke, intracranial bleed, electrolyte disorder, ACS, arrhythmia    I have seen and examined the patient, agree with the workup, evaluation, medical decision making, management and diagnosis.  The care plan has been discussed.    Tayo Alva MD    Procedure  ECG 12 lead    Performed by: Pelon Lara PA-C  Authorized by: Pelon Lara PA-C    Interpretation:     Details:  My EKG interpretation  Rate:     ECG rate:  68    ECG rate assessment: normal    Rhythm:     Rhythm: sinus rhythm    ST segments:     ST segments:  Normal  T waves:     T waves: normal         Pelon Lara PA-C  02/09/24 6512

## 2024-02-11 LAB — BACTERIA UR CULT: ABNORMAL

## 2024-02-12 ENCOUNTER — TELEPHONE (OUTPATIENT)
Dept: PHARMACY | Facility: HOSPITAL | Age: 24
End: 2024-02-12
Payer: COMMERCIAL

## 2024-02-12 DIAGNOSIS — N39.0 URINARY TRACT INFECTION WITHOUT HEMATURIA, SITE UNSPECIFIED: Primary | ICD-10-CM

## 2024-02-12 RX ORDER — SULFAMETHOXAZOLE AND TRIMETHOPRIM 800; 160 MG/1; MG/1
1 TABLET ORAL 2 TIMES DAILY
Qty: 6 TABLET | Refills: 0 | Status: SHIPPED | OUTPATIENT
Start: 2024-02-12 | End: 2024-02-15

## 2024-02-12 NOTE — PROGRESS NOTES
EDPD Note: Initiation     Contacted Sheila Pollock regarding a positive urine culture/result that was taken during their recent emergency room visit. I completed education with patient . The patient is not being treated appropriately.     Sheila presented to the ED with left arm numbness and urinalysis showed (+) UTI. During conversation, she endorses symptoms of urgency/frequency. Due to this, will be sending in treatment for uncomplicated UTI.    The following prescription was sent to the patient's preferred pharmacy. No further follow up needed from EDPD Team.     Drug: Sulfamethoxazole-Trimethoprim 800-160mg Tablets  Sig: Take 1 tablet by mouth twice a day for 3 days  Qty: 6  Days Supply: 3    If there are any other questions for the ED Post-Discharge Culture Follow Up Team, please contact [phone]. Fax: [phone].    Kimberly Gómez, PharmD

## 2024-02-13 NOTE — CONSULTS
Consults      24-year-old lady with history of asthma hyperlipidemia presenting with abdominal pain, imaging suggestive of constipation with ascending colon thickening see official report of CAT scan    Family history reviewed, not pertinent to chief complaint  Denies alcohol use                  The note was created using voice recognition transcription software. Despite proofreading, unintentional typographical errors may be present. Please contact the GI office with any questions or concerns.     Current Medications: reviewed    A 10 point review of system is negative except for what is mentioned in the HPI    Follow up with GI was advised       Vital Signs: Reviewed    Physical Exam:  General: no apparent distress, pleasant and cooperative  Skin:  Warm and dry, no jaundice  HEENT: No scleral icterus, no conjunctival pallor, normocephalic, atraumatic, mucous membranes moist  Neck:  atraumatic, trachea midline, no JVD  Chest:  decreased air entry to auscultation bilaterally. No wheezes, rales, or rhonchi  CV:  Regular rate and rhythm.  Positive S1/S2  Abdomen: no distension, +BS, soft, mild diffuse-tender to palpation, no rebound tenderness, no guarding, no rigidity, no discernible ascites   Extremities: no lower extremity edema, Chronic pigmentary changes, no cyanosis  Neurological:  A&Ox3 , no asterixis  Psychiatric: cooperative     Investigations:  Labs, radiological imaging and cardiac work up were reviewed       1- Abdominal pain with imaging suggestive of constipation with ascending colon thickening see official report of CAT scan, will need long-term bowel regimen, Gas-X or Bentyl as needed, outpatient follow-up for colonoscopy     2-liver lesions on imaging, check MRI with and without contrast

## 2024-02-27 ENCOUNTER — OFFICE VISIT (OUTPATIENT)
Dept: GASTROENTEROLOGY | Facility: CLINIC | Age: 24
End: 2024-02-27
Payer: COMMERCIAL

## 2024-02-27 VITALS
HEART RATE: 91 BPM | BODY MASS INDEX: 27.48 KG/M2 | HEIGHT: 66 IN | WEIGHT: 171 LBS | OXYGEN SATURATION: 98 % | DIASTOLIC BLOOD PRESSURE: 88 MMHG | SYSTOLIC BLOOD PRESSURE: 127 MMHG

## 2024-02-27 DIAGNOSIS — R93.5 ABNORMAL CT OF THE ABDOMEN: ICD-10-CM

## 2024-02-27 DIAGNOSIS — R10.9 ABDOMINAL PAIN, UNSPECIFIED ABDOMINAL LOCATION: ICD-10-CM

## 2024-02-27 DIAGNOSIS — K52.9 CHRONIC DIARRHEA: ICD-10-CM

## 2024-02-27 DIAGNOSIS — K76.9 LIVER LESION: Primary | ICD-10-CM

## 2024-02-27 PROCEDURE — 99214 OFFICE O/P EST MOD 30 MIN: CPT | Performed by: NURSE PRACTITIONER

## 2024-02-27 PROCEDURE — 1036F TOBACCO NON-USER: CPT | Performed by: NURSE PRACTITIONER

## 2024-02-27 PROCEDURE — 3008F BODY MASS INDEX DOCD: CPT | Performed by: NURSE PRACTITIONER

## 2024-02-27 NOTE — PROGRESS NOTES
Assessment/Plan   Sheila Pollock is a 23 y.o. female with history of asthma hyperlipidemia presenting to hospital with epigastric pain, LUQ pain, RLQ abdominal pain.  Lab work unremarkable.  CT findings concerning for constipation, colitis, and liver lesion.  Patient now reports having 1-2 loose brown bowel movements daily.  Patient states upper abdominal symptoms started approximately a year ago after patient did travel to [place], [place] and [facility].    Epigastric abdominal pain, LUQ pain-resolved.  Patient taking PPI 40 once daily since discharge  LLQ pain resolved  Constipation - improving with stool softener, MiraLax PRN  Liver lesion    Continue pantoprazole 40 mg p.o. once daily, patient instructed to take medication 30 minutes before first meal  Avoid NSAIDs  Antireflux lifestyle   Recommend colonoscopy to be done at the endoscopy center  Patient informed she will need a  the day of the procedure  Bowel prep per the endoscopist preference  Labs reviewed  Recommend bowel regimen that consists of fiber supplement once daily and MiraLAX 1-2 doses daily as needed for constipation  Drink at least 70-80 ounces of water daily, exercise 30 minutes daily  Recommend MRI liver with contrast to further evaluate liver lesion  Check stool pathogen PCR, H. pylori, fecal calprotectin, sed rate and CRP  Follow-up with GI 2 weeks after colonoscopy to review results     CHASITY Schmitt-CNP    Subjective     History of Present Illness:   Sheila Pollock is a 23 y.o. female with PMH significant for asthma and HLD who presents to GI clinic for abdominal pain and abnormal CT results.  Patient was admitted to [facility] February 4-5, 2024 for epigastric pain, left upper quadrant pain, and right lower quadrant abdominal pain.    Patient had sudden onset of abdominal pain described as diffuse burning abdominal pain worse to the left upper quadrant that started 2 days ago.  Patient took Pepto-Bismol and an antacid with  no relief.  Patient reports eating 2 pieces of pizza and hot fries the night prior to symptoms.  Patient also reports intermittent constipation occurring every other day over the last year.  Patient reports being constipated with no bowel movement over the past few days with straining to have a small bowel movement yesterday.  Patient has not tried medication over-the-counter.  Patient describes stool as solid/noris with mucus and occasional streaks of blood after straining.  Most recent episode of blood in stool was the end of last year, none recent.     Patient denies a history of diabetes.  No regular EtOH or marijuana use reported.  No regular NSAID use.  No blood thinners at home.  No unintentional weight loss.  No dysphagia or odynophagia.  Patient denies hematemesis or melena.  No diarrhea.  No family history of CRC or IBD.    CBC, CMP, lactate, lipase and amylase normal.  Stool for C. difficile, pathogen PCR and fecal calprotectin ordered but not obtained during hospital stay.    CT A/P 2/4/2024:  IMPRESSION:  1.  Wall thickening at the ascending colon, suggestive of colitis.  Follow-up colonoscopy after treatment recommended to exclude  underlying neoplasm.  2. Constipation with moderate amount of stool at the colon.  3. Appendicoliths are present at the appendix. Mild dilation at the  tip of the appendix to 9 mm. Clinical correlation recommended to  exclude early/developing acute appendicitis.  4. Circumferential wall thickening of the urinary bladder, may be  secondary to underdistention. Please correlate with urinalysis to  exclude superimposed cystitis.  5. Hepatic lesions. Nonemergent contrast-enhanced MRI can be obtained  for further characterization.  6. Increased soft tissue density at the umbilicus. Please correlate  with clinical exam.    Review of Systems  ROS Negative unless otherwise stated above.    Past Medical History   has a past medical history of Body mass index (BMI) 23.0-23.9, adult  (02/10/2022), Body mass index (BMI) 25.0-25.9, adult (10/01/2021), Depression, Hyperlipidemia, Personal history of other diseases of the respiratory system (01/27/2022), Personal history of other infectious and parasitic diseases (08/17/2021), Personal history of other specified conditions (02/10/2022), Personal history of urinary (tract) infections (02/10/2022), and Unspecified asthma with (acute) exacerbation (02/10/2022).     Social History   reports that she has never smoked. She has never used smokeless tobacco. She reports that she does not drink alcohol and does not use drugs.     Family History  family history includes Cancer in her maternal grandfather and maternal grandmother; Hyperlipidemia in her maternal grandfather and mother. No family history of stomach or colon cancer.  No family history of IBD     Allergies  Allergies   Allergen Reactions    Peanut Oil Swelling    Banana Itching     Throat gets itchy    Other Swelling    Peanut Swelling    Soy Other       Medications  Current Outpatient Medications   Medication Instructions    albuterol 90 mcg/actuation inhaler 2 puffs, inhalation, Every 6 hours PRN    drospirenone-ethinyl estradioL (Cleopatra, 28,) 3-0.02 mg tablet 1 tablet, oral, Daily    EPINEPHrine (EPIPEN) 0.3 mg, intramuscular, Once as needed, As Directed    fluticasone (Flonase) 50 mcg/actuation nasal spray 1 spray, Each Nostril, Daily    pantoprazole (PROTONIX) 40 mg, oral, Daily before breakfast, Do not crush, chew, or split.    polyethylene glycol (GLYCOLAX, MIRALAX) 17 g, oral, 2 times daily    sennosides-docusate sodium (Brit-Colace) 8.6-50 mg tablet 2 tablets, oral, 2 times daily        Objective   Visit Vitals  /88   Pulse 91      Constitutional:       General: She is not in acute distress.     Appearance: Normal appearance.   HENT:      Head: Normocephalic.      Nose: Nose normal.      Mouth/Throat:      Mouth: Mucous membranes are moist.      Pharynx: Oropharynx is clear.   Eyes:       Extraocular Movements: Extraocular movements intact.      Conjunctiva/sclera: Conjunctivae normal.      Pupils: Pupils are equal, round, and reactive to light.   Cardiovascular:      Rate and Rhythm: Normal rate and regular rhythm.      Pulses: Normal pulses.      Heart sounds: Normal heart sounds.   Pulmonary:      Effort: Pulmonary effort is normal.      Breath sounds: Normal breath sounds.   Abdominal:      General: Bowel sounds are normal. There is no distension.      Palpations: Abdomen is soft.      Tenderness: There is abdominal tenderness. There is no guarding or rebound.      Comments: Mild LUQ tenderness    Musculoskeletal:         General: Normal range of motion.      Cervical back: Normal range of motion.   Skin:     General: Skin is warm and dry.   Neurological:      General: No focal deficit present.      Mental Status: She is alert and oriented to person, place, and time.   Psychiatric:         Mood and Affect: Mood normal.         Behavior: Behavior normal.     Lab Results   Component Value Date    WBC 5.4 02/09/2024    WBC 5.8 02/05/2024    WBC 9.2 02/04/2024    HGB 12.1 02/09/2024    HGB 11.6 (L) 02/05/2024    HGB 12.7 02/04/2024    HCT 35.8 (L) 02/09/2024    HCT 33.9 (L) 02/05/2024    HCT 36.7 02/04/2024     02/09/2024     02/05/2024     02/04/2024     Lab Results   Component Value Date     02/09/2024    K 3.8 02/09/2024     02/09/2024    CO2 24 02/09/2024    BUN 5 (L) 02/09/2024    CREATININE 0.60 02/09/2024    GLUCOSE 80 02/09/2024    CALCIUM 9.6 02/09/2024       Lab Results   Component Value Date    ALKPHOS 54 02/09/2024    ALKPHOS 51 02/04/2024    ALKPHOS 46 09/16/2022    BILITOT 0.3 02/09/2024    BILITOT 0.2 02/04/2024    BILITOT 0.4 09/16/2022    PROT 7.6 02/09/2024    PROT 7.3 02/04/2024    PROT 7.5 09/16/2022    ALT 9 02/09/2024    ALT 9 02/04/2024    ALT 9 09/16/2022    AST 14 02/09/2024    AST 12 02/04/2024    AST 13 09/16/2022      Lab Results    Component Value Date    INR 1.0 02/02/2022

## 2024-02-29 ENCOUNTER — ANESTHESIA EVENT (OUTPATIENT)
Dept: GASTROENTEROLOGY | Facility: EXTERNAL LOCATION | Age: 24
End: 2024-02-29

## 2024-03-05 ENCOUNTER — ANESTHESIA (OUTPATIENT)
Dept: GASTROENTEROLOGY | Facility: EXTERNAL LOCATION | Age: 24
End: 2024-03-05

## 2024-03-05 ENCOUNTER — HOSPITAL ENCOUNTER (OUTPATIENT)
Dept: GASTROENTEROLOGY | Facility: EXTERNAL LOCATION | Age: 24
Discharge: HOME | End: 2024-03-05
Payer: COMMERCIAL

## 2024-03-05 VITALS
HEIGHT: 66 IN | SYSTOLIC BLOOD PRESSURE: 129 MMHG | WEIGHT: 170 LBS | RESPIRATION RATE: 14 BRPM | DIASTOLIC BLOOD PRESSURE: 80 MMHG | TEMPERATURE: 98.1 F | OXYGEN SATURATION: 100 % | BODY MASS INDEX: 27.32 KG/M2 | HEART RATE: 85 BPM

## 2024-03-05 DIAGNOSIS — R10.9 ABDOMINAL PAIN, UNSPECIFIED ABDOMINAL LOCATION: ICD-10-CM

## 2024-03-05 DIAGNOSIS — R93.5 ABNORMAL CT OF THE ABDOMEN: ICD-10-CM

## 2024-03-05 LAB — PREGNANCY TEST URINE, POC: NEGATIVE

## 2024-03-05 PROCEDURE — 45378 DIAGNOSTIC COLONOSCOPY: CPT | Performed by: STUDENT IN AN ORGANIZED HEALTH CARE EDUCATION/TRAINING PROGRAM

## 2024-03-05 RX ORDER — PROPOFOL 10 MG/ML
INJECTION, EMULSION INTRAVENOUS AS NEEDED
Status: DISCONTINUED | OUTPATIENT
Start: 2024-03-05 | End: 2024-03-05

## 2024-03-05 RX ORDER — SODIUM CHLORIDE 9 MG/ML
20 INJECTION, SOLUTION INTRAVENOUS CONTINUOUS
Status: DISCONTINUED | OUTPATIENT
Start: 2024-03-05 | End: 2024-03-06 | Stop reason: HOSPADM

## 2024-03-05 RX ORDER — LIDOCAINE HYDROCHLORIDE 20 MG/ML
INJECTION, SOLUTION INFILTRATION; PERINEURAL AS NEEDED
Status: DISCONTINUED | OUTPATIENT
Start: 2024-03-05 | End: 2024-03-05

## 2024-03-05 RX ADMIN — PROPOFOL 150 MG: 10 INJECTION, EMULSION INTRAVENOUS at 12:02

## 2024-03-05 RX ADMIN — LIDOCAINE HYDROCHLORIDE 50 MG: 20 INJECTION, SOLUTION INFILTRATION; PERINEURAL at 12:02

## 2024-03-05 RX ADMIN — SODIUM CHLORIDE: 9 INJECTION, SOLUTION INTRAVENOUS at 11:58

## 2024-03-05 RX ADMIN — PROPOFOL 50 MG: 10 INJECTION, EMULSION INTRAVENOUS at 12:11

## 2024-03-05 RX ADMIN — PROPOFOL 150 MG: 10 INJECTION, EMULSION INTRAVENOUS at 12:05

## 2024-03-05 SDOH — HEALTH STABILITY: MENTAL HEALTH: CURRENT SMOKER: 0

## 2024-03-05 ASSESSMENT — PAIN SCALES - GENERAL
PAINLEVEL_OUTOF10: 0 - NO PAIN
PAIN_LEVEL: 0

## 2024-03-05 ASSESSMENT — PAIN - FUNCTIONAL ASSESSMENT
PAIN_FUNCTIONAL_ASSESSMENT: 0-10

## 2024-03-05 ASSESSMENT — COLUMBIA-SUICIDE SEVERITY RATING SCALE - C-SSRS
6. HAVE YOU EVER DONE ANYTHING, STARTED TO DO ANYTHING, OR PREPARED TO DO ANYTHING TO END YOUR LIFE?: NO
2. HAVE YOU ACTUALLY HAD ANY THOUGHTS OF KILLING YOURSELF?: NO
1. IN THE PAST MONTH, HAVE YOU WISHED YOU WERE DEAD OR WISHED YOU COULD GO TO SLEEP AND NOT WAKE UP?: NO

## 2024-03-05 NOTE — LETTER
March 5, 2024     Patient: Sheila Pollock   YOB: 2000   Date of Visit: 3/5/2024       To Whom It May Concern:    Sheila Pollock was seen in my clinic on 3/5/2024 at 11:20 am. Please excuse Sheila for her absence from work on this day to make the appointment.    If you have any questions or concerns, please don't hesitate to call.         Sincerely,         Juancho Faye, DO        CC: No Recipients

## 2024-03-05 NOTE — ANESTHESIA PREPROCEDURE EVALUATION
Patient: Sheila Pollock    Procedure Information       Anesthesia Start Date/Time: 03/05/24 1158    Scheduled providers: Juancho Faye DO; Richa Jaime RN; CHASITY Contreras-AZUL; Jhonatan Patel MA    Procedure: COLONOSCOPY    Location: [facility]            Relevant Problems   Cardiovascular   (+) Hyperlipidemia      Neuro/Psych   (+) Anxiety disorder      Pulmonary   (+) Asthma       Clinical information reviewed:   Tobacco  Allergies  Meds   Med Hx  Surg Hx  OB Status  Fam Hx  Soc   Hx        NPO Detail:  NPO/Void Status  Carbohydrate Drink Given Prior to Surgery? : N  Date of Last Liquid: 03/05/24  Time of Last Liquid: 0630  Date of Last Solid: 03/03/24  Time of Last Solid: 1800  Last Intake Type: Clear fluids  Time of Last Void: 1140         Physical Exam    Airway  Mallampati: II  TM distance: >3 FB  Neck ROM: full     Cardiovascular - normal exam     Dental - normal exam     Pulmonary - normal exam  Breath sounds clear to auscultation     Abdominal            Anesthesia Plan    History of general anesthesia?: yes  History of complications of general anesthesia?: no    ASA 2     MAC     The patient is not a current smoker.    intravenous induction   Anesthetic plan and risks discussed with patient.    Plan discussed with CRNA.      
sleepy
asleep

## 2024-03-05 NOTE — Clinical Note
Patient tolerated the procedure well and is comfortable with no complaints of pain. Vital signs stable.  Abdomen soft and non distended. prior to transport. Patient transported to PACU via stretcher. Handoff completed.

## 2024-03-05 NOTE — LETTER
March 5, 2024     Patient: Sheila Pollock   YOB: 2000   Date of Visit: 3/5/2024       To Whom It May Concern:    Sheila Pollock was seen in my clinic on 3/5/2024 at 11:20 am. Please excuse Sheila for her absence from Monday 3/4/24-3/5/24 work on this day to make the for her procedure    If you have any questions or concerns, please don't hesitate to call.         Sincerely,         Juancho Faye, DO        CC: No Recipients

## 2024-03-05 NOTE — H&P
Outpatient Hospital Procedure H&P    Patient Profile  Name Sheila Pollock  Date of Birth 2000  MRN [id number]  PCP Urban German        Diagnosis: Abdominal pain, constipation.  Procedure(s):  Colonoscopy.    Allergies  Allergies   Allergen Reactions    Peanut Oil Swelling    Banana Itching     Throat gets itchy    Other Swelling    Peanut Swelling    Soy Other       Past Medical History   Past Medical History:   Diagnosis Date    Body mass index (BMI) 23.0-23.9, adult 02/10/2022    Body mass index (BMI) of 23.0 to 23.9 in adult    Body mass index (BMI) 25.0-25.9, adult 10/01/2021    Body mass index (BMI) of 25.0 to 25.9 in adult    Depression     Hyperlipidemia     Personal history of other diseases of the respiratory system 01/27/2022    History of acute pharyngitis    Personal history of other infectious and parasitic diseases 08/17/2021    History of candidiasis of vagina    Personal history of other specified conditions 02/10/2022    History of chest pain    Personal history of urinary (tract) infections 02/10/2022    History of urinary tract infection    Unspecified asthma with (acute) exacerbation 02/10/2022    Asthma exacerbation       Medication Reviewed - yes  Prior to Admission medications    Medication Sig Start Date End Date Taking? Authorizing Provider   albuterol 90 mcg/actuation inhaler Inhale 2 puffs every 6 hours if needed. 3/4/23  Yes Historical Provider, MD   fluticasone (Flonase) 50 mcg/actuation nasal spray Administer 1 spray into each nostril once daily. 10/27/11  Yes Historical Provider, MD   EPINEPHrine 0.3 mg/0.3 mL injection syringe Inject 0.3 mL (0.3 mg) into the muscle 1 time if needed. As Directed 5/2/23   Historical Provider, MD   drospirenone-ethinyl estradioL (Cleopatra, Bill,) 3-0.02 mg tablet Take 1 tablet by mouth once daily. 11/3/23 3/5/24  Ivania Torres PA-C   pantoprazole (ProtoNix) 40 mg EC tablet Take 1 tablet (40 mg) by mouth once daily in the morning. Take before meals.  Do not crush, chew, or split. Do not start before February 6, 2024. 2/6/24 3/5/24  Liya Feng PA-C   polyethylene glycol (Glycolax, Miralax) 17 gram packet Take 17 g by mouth 2 times a day. 2/5/24 3/5/24  Liya Feng PA-C   sennosides-docusate sodium (Brit-Colace) 8.6-50 mg tablet Take 2 tablets by mouth 2 times a day. 2/5/24 3/5/24  Liya Feng PA-C       Physical Exam  Vitals:    03/05/24 1139   BP: 116/82   Pulse: 82   Resp: (!) 9   Temp: 36.5 °C (97.7 °F)   SpO2: 100%      Weight   Vitals:    03/05/24 1139   Weight: 77.1 kg (170 lb)     BMI Body mass index is 27.44 kg/m².    General: A&Ox3, NAD.  HEENT: AT/NC.   CV: RRR. No murmur.  Resp: CTA bilaterally. No wheezing, rhonchi or rales.   GI: Soft, NT/ND. BSx4.  Extrem: No edema. Pulses intact.  Skin: No Jaundice.   Neuro: No focal deficits.   Psych: Normal mood and affect.        Oropharyngeal Classification II (hard and soft palate, upper portion of tonsils anduvula visible)  ASA PS Classification 2  Sedation Plan: Deep Sedation.  Procedure Plan - pre-procedural (re)assesment completed by physician:  discharge/transfer patient when discharge criteria met    Juancho Faye DO  3/5/2024 11:53 AM

## 2024-03-05 NOTE — ANESTHESIA POSTPROCEDURE EVALUATION
Patient: Sheila Pollock    Procedure Summary       Date: 03/05/24 Room / Location: [facility]    Anesthesia Start: 1158 Anesthesia Stop:     Procedure: COLONOSCOPY Diagnosis:       Abdominal pain, unspecified abdominal location      Abnormal CT of the abdomen    Scheduled Providers: Juancho Faye DO; Richa Jaime RN; JANKI Contreras; Jhonatan Patel MA Responsible Provider: JANKI Contreras    Anesthesia Type: MAC ASA Status: 2            Anesthesia Type: MAC    Vitals Value Taken Time   /78 03/05/24 1220   Temp 36.7 03/05/24 1220   Pulse 100 03/05/24 1220   Resp 16 03/05/24 1220   SpO2 100 03/05/24 1220       Anesthesia Post Evaluation    Patient location during evaluation: bedside  Patient participation: complete - patient cannot participate  Level of consciousness: awake and responsive to verbal stimuli  Pain score: 0  Pain management: adequate  Airway patency: patent  Cardiovascular status: acceptable and hemodynamically stable  Respiratory status: acceptable  Hydration status: acceptable  Postoperative Nausea and Vomiting: none        No notable events documented.

## 2024-03-05 NOTE — DISCHARGE INSTRUCTIONS
Patient Instructions Post Procedure      The anesthetics, sedatives or narcotics which were given to you today will be acting in your body for the next 24 hours, so you might feel a little sleepy or groggy.  This feeling should slowly wear off. Carefully read and follow the instructions.     You received sedation today:  - Do not drive or operate any machinery or power tools of any kind.   - No alcoholic beverages today, not even beer or wine.  - Do not make any important decisions or sign any legal documents.  - No over the counter medications that contain alcohol or that may cause drowsiness.    While it is common to experience mild to moderate abdominal distention, gas, or belching after your procedure, if any of these symptoms occur following discharge from the GI Lab or within one week of having your procedure, call the [facility] to be advised whether a visit to your nearest Urgent Care or Emergency Department is indicated.  Take this paper with you if you go.   - If you develop an allergic reaction to the medications that were given during your procedure such as difficulty breathing, rash, hives, severe nausea, vomiting or lightheadedness.  - If you experience chest pain, shortness of breath, severe abdominal pain, fevers and chills.  -If you develop signs and symptoms of bleeding such as blood in your spit, if your stools turn black, tarry, or bloody  - If you have not urinated within 8 hours following your procedure.  - If your IV site becomes painful, red, inflamed, or looks infected.    If you received a biopsy/polypectomy/sphincterotomy the following instructions apply below:  __ Do not use Aspirin containing products, non-steroidal medications or anti-coagulants for one week following your procedure. (Examples of these types of medications are: Advil, Arthrotec, Aleve, Coumadin, Ecotrin, Heparin, Ibuprofen, Indocin, Motrin, Naprosyn, Nuprin, Plavix, Vioxx, and Voltarin, or their generic  forms.  This list is not all-inclusive.  Check with your physician or pharmacist before resuming medications.)   __ Eat a soft diet today.  Avoid foods that are poorly digested for the next 24 hours.  These foods would include: nuts, beans, lettuce, red meats, and fried foods. Start with liquids and advance your diet as tolerated, gradually work up to eating solids.   __ Do not have a Barium Study or Enema for one week.    Your physician recommends the additional following instructions:    -You have a contact number available for emergencies. The signs and symptoms of potential delayed complications were discussed with you. You may return to normal activities tomorrow.  -Resume your previous diet or other if specified.  -Continue your present medications.   -We are waiting for your pathology results, if applicable.  -The findings and recommendations have been discussed with you and/or family.  - Please see Medication Reconciliation Form for new medication/medications prescribed.     If you experience any problems or have any questions following discharge from the GI Lab, please call: [phone] from 7 am- 4:30 pm.  In the event of an emergency please go to the closest Emergency Department or call Dr. Faye [phone]

## 2024-03-19 ENCOUNTER — OFFICE VISIT (OUTPATIENT)
Dept: GASTROENTEROLOGY | Facility: CLINIC | Age: 24
End: 2024-03-19
Payer: COMMERCIAL

## 2024-03-19 VITALS
SYSTOLIC BLOOD PRESSURE: 120 MMHG | WEIGHT: 171 LBS | DIASTOLIC BLOOD PRESSURE: 85 MMHG | BODY MASS INDEX: 27.48 KG/M2 | HEIGHT: 66 IN | OXYGEN SATURATION: 98 % | HEART RATE: 77 BPM

## 2024-03-19 DIAGNOSIS — K64.8 INTERNAL HEMORRHOIDS: ICD-10-CM

## 2024-03-19 DIAGNOSIS — K52.9 COLITIS: Primary | ICD-10-CM

## 2024-03-19 PROCEDURE — 3008F BODY MASS INDEX DOCD: CPT | Performed by: NURSE PRACTITIONER

## 2024-03-19 PROCEDURE — 99214 OFFICE O/P EST MOD 30 MIN: CPT | Performed by: NURSE PRACTITIONER

## 2024-03-19 PROCEDURE — 1036F TOBACCO NON-USER: CPT | Performed by: NURSE PRACTITIONER

## 2024-03-19 NOTE — PROGRESS NOTES
Assessment/Plan   Sheila Pollock is a 23 y.o. female who presents to GI clinic for follow up colonoscopy.  Patient was last seen in the office a month ago for abdominal pain and abnormal CT abdomen results.  Patient had epigastric abdominal pain and left upper quadrant abdominal pain that had resolved with PPI and bland diet.  She had left lower quadrant pain that also resolved for being seen by GI.  She did endorse constipation and was started on fiber supplement and MiraLAX as needed.  Patient had CT abdomen and pelvis which showed wall thickening of the ascending colon suggesting colitis.  She also had a liver lesion noted on CT and an MRI liver has been ordered and is scheduled for next week.  Patient had colonoscopy indicated for abnormal CT of the abdomen March 5, 2024 which was normal other than internal hemorrhoids.  Currently, patient denies abdominal pain, weight loss, nausea, vomiting, diarrhea or constipation.  No black or bloody stools.  Patient has been compliant with fiber supplement and MiraLAX as needed and is having more regular bowel movements.    Abnormal CT findings of wall thickening of the ascending colon suggesting colitis.  Colonoscopy 3/2024 normal other than internal hemorrhoids.  Constipation -symptoms improved with fiber supplement once daily and MiraLAX as needed  Liver lesion on CT-MRI liver ordered  Contraception - She was on (generic) Cleopatra last year for a few months. Stopped it in November or December last year. IUD previously.     Likely colitis was related to an viral versus bacterial infectious origin.  Symptoms started shortly after traveling to [facility].  Continue high-fiber diet and take fiber supplement once daily  May use MiraLAX 1-2 doses daily as needed for occasional constipation  Drink at least 70-80 ounces of water daily.  Get at least 30 minutes physical activity daily.  If hemorrhoids tend to flare, may soak in warm water for 15 minutes twice  daily.  Apply witch hazel to affected area for 1 to 2 minutes twice daily.  May use Preparation H suppositories/ointment twice daily x 1 week.  Results of MRI liver will be called to patient once available.      CHASITY Schmitt-SIMEON      Subjective     History of Present Illness:   Sheila Pollock is a 23 y.o. female who presents to GI clinic for follow up colonoscopy.  Patient was last seen in the office a month ago for abdominal pain and abnormal CT abdomen results.  Patient had epigastric abdominal pain and left upper quadrant abdominal pain that had resolved with PPI and bland diet.  She had left lower quadrant pain that also resolved for being seen by GI.  She did endorse constipation and was started on fiber supplement and MiraLAX as needed.  Patient had CT abdomen and pelvis which showed wall thickening of the ascending colon suggesting colitis.  She also had a liver lesion noted on CT and an MRI liver has been ordered and is scheduled for next week.  Patient had colonoscopy indicated for abnormal CT of the abdomen March 5, 2024 which was normal other than internal hemorrhoids.  Currently, patient denies abdominal pain, weight loss, nausea, vomiting, diarrhea or constipation.  No black or bloody stools.  Patient has been compliant with fiber supplement and MiraLAX as needed and is having more regular bowel movements.    Colonoscopy 03/05/2024 indicated for abnormal CT of the abdomen, abdominal pain  The terminal ileum appeared normal.  Normal colon (albeit hemorrhoids).     LOV 02/27/2024  Sheila Pollock is a 23 y.o. female with PMH significant for asthma and HLD who presents to GI clinic for abdominal pain and abnormal CT results.  Patient was admitted to [facility] February 4-5, 2024 for epigastric pain, left upper quadrant pain, and right lower quadrant abdominal pain.     Patient had sudden onset of abdominal pain described as diffuse burning abdominal pain worse to the left upper quadrant that  started 2 days ago.  Patient took Pepto-Bismol and an antacid with no relief.  Patient reports eating 2 pieces of pizza and hot fries the night prior to symptoms.  Patient also reports intermittent constipation occurring every other day over the last year.  Patient reports being constipated with no bowel movement over the past few days with straining to have a small bowel movement yesterday.  Patient has not tried medication over-the-counter.  Patient describes stool as solid/noris with mucus and occasional streaks of blood after straining.  Most recent episode of blood in stool was the end of last year, none recent.     Patient denies a history of diabetes.  No regular EtOH or marijuana use reported.  No regular NSAID use.  No blood thinners at home.  No unintentional weight loss.  No dysphagia or odynophagia.  Patient denies hematemesis or melena.  No diarrhea.  No family history of CRC or IBD.     CBC, CMP, lactate, lipase and amylase normal.  Stool for C. difficile, pathogen PCR and fecal calprotectin ordered but not obtained during hospital stay.     CT A/P 2/4/2024:  IMPRESSION:  1.  Wall thickening at the ascending colon, suggestive of colitis.  Follow-up colonoscopy after treatment recommended to exclude  underlying neoplasm.  2. Constipation with moderate amount of stool at the colon.  3. Appendicoliths are present at the appendix. Mild dilation at the  tip of the appendix to 9 mm. Clinical correlation recommended to  exclude early/developing acute appendicitis.  4. Circumferential wall thickening of the urinary bladder, may be  secondary to underdistention. Please correlate with urinalysis to  exclude superimposed cystitis.  5. Hepatic lesions. Nonemergent contrast-enhanced MRI can be obtained  for further characterization.  6. Increased soft tissue density at the umbilicus. Please correlate  with clinical exam.    Review of Systems  ROS Negative unless otherwise stated above.    Past Medical History    has a past medical history of Body mass index (BMI) 23.0-23.9, adult (02/10/2022), Body mass index (BMI) 25.0-25.9, adult (10/01/2021), Depression, Hyperlipidemia, Personal history of other diseases of the respiratory system (01/27/2022), Personal history of other infectious and parasitic diseases (08/17/2021), Personal history of other specified conditions (02/10/2022), Personal history of urinary (tract) infections (02/10/2022), and Unspecified asthma with (acute) exacerbation (02/10/2022).     Social History   reports that she has never smoked. She has never used smokeless tobacco. She reports that she does not drink alcohol and does not use drugs.     Family History  family history includes Cancer in her maternal grandfather and maternal grandmother; Hyperlipidemia in her maternal grandfather and mother.     Allergies  Allergies   Allergen Reactions    Peanut Oil Swelling    Banana Itching     Throat gets itchy    Other Swelling    Peanut Swelling    Soy Other       Medications  Current Outpatient Medications   Medication Instructions    albuterol 90 mcg/actuation inhaler 2 puffs, inhalation, Every 6 hours PRN    EPINEPHrine (EPIPEN) 0.3 mg, intramuscular, Once as needed, As Directed    fluticasone (Flonase) 50 mcg/actuation nasal spray 1 spray, Each Nostril, Daily        Objective   Visit Vitals  /85   Pulse 77      General: A&Ox3, NAD.  HEENT: AT/NC.   CV: RRR. No murmur.  Resp: CTA bilaterally. No wheezing, rhonchi or rales.   GI: Soft, NT/ND. BSx4.  Extrem: No edema. Pulses intact.  Skin: No Jaundice.   Neuro: No focal deficits.   Psych: Normal mood and affect.     Lab Results   Component Value Date    WBC 5.4 02/09/2024    WBC 5.8 02/05/2024    WBC 9.2 02/04/2024    HGB 12.1 02/09/2024    HGB 11.6 (L) 02/05/2024    HGB 12.7 02/04/2024    HCT 35.8 (L) 02/09/2024    HCT 33.9 (L) 02/05/2024    HCT 36.7 02/04/2024     02/09/2024     02/05/2024     02/04/2024     Lab Results    Component Value Date     02/09/2024    K 3.8 02/09/2024     02/09/2024    CO2 24 02/09/2024    BUN 5 (L) 02/09/2024    CREATININE 0.60 02/09/2024    GLUCOSE 80 02/09/2024    CALCIUM 9.6 02/09/2024       Lab Results   Component Value Date    ALKPHOS 54 02/09/2024    ALKPHOS 51 02/04/2024    ALKPHOS 46 09/16/2022    BILITOT 0.3 02/09/2024    BILITOT 0.2 02/04/2024    BILITOT 0.4 09/16/2022    PROT 7.6 02/09/2024    PROT 7.3 02/04/2024    PROT 7.5 09/16/2022    ALT 9 02/09/2024    ALT 9 02/04/2024    ALT 9 09/16/2022    AST 14 02/09/2024    AST 12 02/04/2024    AST 13 09/16/2022      Lab Results   Component Value Date    INR 1.0 02/02/2022

## 2024-03-28 ENCOUNTER — HOSPITAL ENCOUNTER (OUTPATIENT)
Dept: RADIOLOGY | Facility: CLINIC | Age: 24
Discharge: HOME | End: 2024-03-28
Payer: COMMERCIAL

## 2024-03-28 DIAGNOSIS — K76.9 LIVER LESION: ICD-10-CM

## 2024-03-28 PROCEDURE — A9575 INJ GADOTERATE MEGLUMI 0.1ML: HCPCS | Performed by: PHYSICIAN ASSISTANT

## 2024-03-28 PROCEDURE — 2550000001 HC RX 255 CONTRASTS: Performed by: PHYSICIAN ASSISTANT

## 2024-03-28 PROCEDURE — 74183 MRI ABD W/O CNTR FLWD CNTR: CPT

## 2024-03-28 PROCEDURE — 74183 MRI ABD W/O CNTR FLWD CNTR: CPT | Performed by: STUDENT IN AN ORGANIZED HEALTH CARE EDUCATION/TRAINING PROGRAM

## 2024-03-28 RX ORDER — GADOTERATE MEGLUMINE 376.9 MG/ML
17 INJECTION INTRAVENOUS
Status: COMPLETED | OUTPATIENT
Start: 2024-03-28 | End: 2024-03-28

## 2024-03-28 RX ADMIN — GADOTERATE MEGLUMINE 17 ML: 376.9 INJECTION INTRAVENOUS at 12:42

## 2024-03-29 ENCOUNTER — TELEPHONE (OUTPATIENT)
Dept: PRIMARY CARE | Facility: CLINIC | Age: 24
End: 2024-03-29
Payer: COMMERCIAL

## 2024-03-29 DIAGNOSIS — K76.9 LIVER LESION: Primary | ICD-10-CM

## 2024-03-29 NOTE — TELEPHONE ENCOUNTER
Patient called the office asking if someone can call her back to go over the results of her MRI.  She said Ivania asked her if she would see a liver doctor but doesn't know the results.

## 2024-04-04 ENCOUNTER — OFFICE VISIT (OUTPATIENT)
Dept: PRIMARY CARE | Facility: CLINIC | Age: 24
End: 2024-04-04
Payer: COMMERCIAL

## 2024-04-04 VITALS
HEIGHT: 66 IN | SYSTOLIC BLOOD PRESSURE: 118 MMHG | HEART RATE: 79 BPM | RESPIRATION RATE: 18 BRPM | WEIGHT: 174 LBS | BODY MASS INDEX: 27.97 KG/M2 | OXYGEN SATURATION: 100 % | DIASTOLIC BLOOD PRESSURE: 84 MMHG | TEMPERATURE: 98.2 F

## 2024-04-04 DIAGNOSIS — K76.9 LIVER LESION: Primary | ICD-10-CM

## 2024-04-04 PROCEDURE — 3008F BODY MASS INDEX DOCD: CPT | Performed by: PHYSICIAN ASSISTANT

## 2024-04-04 PROCEDURE — 99212 OFFICE O/P EST SF 10 MIN: CPT | Performed by: PHYSICIAN ASSISTANT

## 2024-04-04 NOTE — PROGRESS NOTES
"Subjective   Patient ID: Sheila Pollock is a 23 y.o. female who presents for Follow-up (Pt here today for a follow up for for MRI done on 3/28 by Gastro doc-RAJ RAMIREZ.  ).    HPI     Feb had abd pain and had to go to the hospital   - Followed with GI for abnormal findings from that visit - got scope, it was ok, got MRI done showed liver lesions     Review of Systems   Gastrointestinal:  Negative for abdominal distention, abdominal pain, anal bleeding, blood in stool, constipation, diarrhea, nausea and vomiting.       Objective   /84   Pulse 79   Temp 36.8 °C (98.2 °F)   Resp 18   Ht 1.676 m (5' 6\")   Wt 78.9 kg (174 lb)   SpO2 100%   BMI 28.08 kg/m²     Physical Exam  Constitutional:       Appearance: Normal appearance.   Cardiovascular:      Rate and Rhythm: Normal rate and regular rhythm.      Pulses: Normal pulses.      Heart sounds: Normal heart sounds. No murmur heard.  Pulmonary:      Effort: Pulmonary effort is normal.      Breath sounds: Normal breath sounds.   Abdominal:      General: Abdomen is flat. Bowel sounds are normal. There is no distension.      Palpations: There is no mass.      Tenderness: There is no abdominal tenderness. There is no guarding or rebound.      Hernia: No hernia is present.   Neurological:      Mental Status: She is alert.   Psychiatric:         Mood and Affect: Mood and affect normal.         Assessment/Plan     Problem List Items Addressed This Visit       Liver lesion - Primary     - educated the pt about MRI results     - Recommend she follow up with GI about this. Likely needs repeat MRI in 6 months as recommended by radiologist.     "

## 2024-04-11 PROBLEM — K76.9 LIVER LESION: Status: ACTIVE | Noted: 2024-04-11

## 2024-04-11 ASSESSMENT — ENCOUNTER SYMPTOMS
ABDOMINAL DISTENTION: 0
DIARRHEA: 0
NAUSEA: 0
CONSTIPATION: 0
BLOOD IN STOOL: 0
ABDOMINAL PAIN: 0
ANAL BLEEDING: 0
VOMITING: 0

## 2024-04-12 ENCOUNTER — OFFICE VISIT (OUTPATIENT)
Dept: NEUROLOGY | Facility: CLINIC | Age: 24
End: 2024-04-12
Payer: COMMERCIAL

## 2024-04-12 VITALS
HEART RATE: 105 BPM | DIASTOLIC BLOOD PRESSURE: 83 MMHG | BODY MASS INDEX: 27.83 KG/M2 | HEIGHT: 66 IN | SYSTOLIC BLOOD PRESSURE: 121 MMHG | WEIGHT: 173.2 LBS

## 2024-04-12 DIAGNOSIS — G56.02 LEFT CARPAL TUNNEL SYNDROME: ICD-10-CM

## 2024-04-12 DIAGNOSIS — R25.1 FUNCTIONAL TREMOR: Primary | ICD-10-CM

## 2024-04-12 PROCEDURE — 3008F BODY MASS INDEX DOCD: CPT | Performed by: STUDENT IN AN ORGANIZED HEALTH CARE EDUCATION/TRAINING PROGRAM

## 2024-04-12 PROCEDURE — 1036F TOBACCO NON-USER: CPT | Performed by: STUDENT IN AN ORGANIZED HEALTH CARE EDUCATION/TRAINING PROGRAM

## 2024-04-12 PROCEDURE — 99204 OFFICE O/P NEW MOD 45 MIN: CPT | Performed by: STUDENT IN AN ORGANIZED HEALTH CARE EDUCATION/TRAINING PROGRAM

## 2024-04-12 ASSESSMENT — PATIENT HEALTH QUESTIONNAIRE - PHQ9
2. FEELING DOWN, DEPRESSED OR HOPELESS: SEVERAL DAYS
SUM OF ALL RESPONSES TO PHQ9 QUESTIONS 1 & 2: 1
10. IF YOU CHECKED OFF ANY PROBLEMS, HOW DIFFICULT HAVE THESE PROBLEMS MADE IT FOR YOU TO DO YOUR WORK, TAKE CARE OF THINGS AT HOME, OR GET ALONG WITH OTHER PEOPLE: SOMEWHAT DIFFICULT
1. LITTLE INTEREST OR PLEASURE IN DOING THINGS: NOT AT ALL

## 2024-04-12 NOTE — PATIENT INSTRUCTIONS
Thank you for your visit today. You were seen by Dr. Collins for and episode of left hand weakness and episodes of hand tremor. I suspect it was related to functional neurological disorder, or anxiety-related symptoms. You may also have a mild degree of left carpal tunnel syndrome. It does not require treatment unless these symptoms worsen. If the left hand tingling becomes persistent, then I would suggest wearing a neutral wrist brace while sleeping. If you have any questions or need to reach me, call my office at [phone].

## 2024-04-12 NOTE — PROGRESS NOTES
Subjective     Sheila Pollock is a right handed  23 y.o. year old female who presents with New Patient Visit (NPV- Upper Extremity Paresthesia Left Arm). Patient is accompanied by: Praful  Visit type: new patient visit     On 2/9/24 she was at work when she had sudden onset stuttering, anxiety, and left hand weakness and numbness. She was unable to form words properly so she was sent to ER by her work. Around that time she also had severe abdominal pain, diarrhea, and was told she had an infection. The symptoms resolved after 30 minutes. She did not have headache at that time. She does have a history of migraine occurring once every 2 weeks.    More recently since then has had 5-minute episodes of left hand shaking associated with hand posturing. If she is having an episodes she will be able to suppress it voluntarily. She has had 3-4 of these episodes. Additionally she has difficulty gripping small items with her left hand.    Prior to that in Sept 2023 she had an episode of paranoia/psychosis requiring psychiatric admission for a week. She was diagnosed with anxiety. She works as a .    Patient Active Problem List   Diagnosis    Skin rash    Allergic rhinitis    Anxiety disorder    Asthma ([facility])    Hyperlipidemia    Vitamin D deficiency    Annual physical exam    Cervical cancer screening    Right lower quadrant abdominal pain    Liver lesion      Past Medical History:   Diagnosis Date    Body mass index (BMI) 23.0-23.9, adult 02/10/2022    Body mass index (BMI) of 23.0 to 23.9 in adult    Body mass index (BMI) 25.0-25.9, adult 10/01/2021    Body mass index (BMI) of 25.0 to 25.9 in adult    Depression     Hyperlipidemia     Personal history of other diseases of the respiratory system 01/27/2022    History of acute pharyngitis    Personal history of other infectious and parasitic diseases 08/17/2021    History of candidiasis of vagina    Personal history of other specified  conditions 02/10/2022    History of chest pain    Personal history of urinary (tract) infections 02/10/2022    History of urinary tract infection    Unspecified asthma with (acute) exacerbation ([facility]) 02/10/2022    Asthma exacerbation      Past Surgical History:   Procedure Laterality Date    CARDIAC SURGERY  02/10/2022    4 or 5 years old per patient.    MANDIBLE SURGERY  02/10/2022    in 2016 to align jaw after braces.      Social History     Socioeconomic History    Marital status: Single     Spouse name: Not on file    Number of children: Not on file    Years of education: Not on file    Highest education level: Not on file   Occupational History    Not on file   Tobacco Use    Smoking status: Never    Smokeless tobacco: Never   Vaping Use    Vaping status: Former   Substance and Sexual Activity    Alcohol use: Never    Drug use: Never    Sexual activity: Not Currently   Other Topics Concern    Not on file   Social History Narrative    Not on file     Social Determinants of Health     Financial Resource Strain: Low Risk  (2/4/2024)    Overall Financial Resource Strain (CARDIA)     Difficulty of Paying Living Expenses: Not hard at all   Food Insecurity: Not on file (3/4/2023)   Transportation Needs: No Transportation Needs (2/4/2024)    PRAPARE - Transportation     Lack of Transportation (Medical): No     Lack of Transportation (Non-Medical): No   Physical Activity: Not on file   Stress: Not on file   Social Connections: Not on file   Intimate Partner Violence: Not on file   Housing Stability: Low Risk  (2/4/2024)    Housing Stability Vital Sign     Unable to Pay for Housing in the Last Year: No     Number of Places Lived in the Last Year: 1     Unstable Housing in the Last Year: No      Family History   Problem Relation Name Age of Onset    Hyperlipidemia Mother      Cancer Maternal Grandmother          in remission.    Hyperlipidemia Maternal Grandfather      Cancer Maternal Grandfather          testicular  "     Patient Health Questionnaire-2 Score: 1          Review of Systems  All other system have been reviewed and are negative for complaint.    Vitals:    04/12/24 1531   BP: 121/83   BP Location: Left arm   Patient Position: Sitting   BP Cuff Size: Adult   Pulse: 105   Weight: 78.6 kg (173 lb 3.2 oz)   Height: 1.676 m (5' 6\")     Objective   Neurological Exam  Mental Status  Awake, alert and oriented to person, place and time. Speech is normal. Language is fluent with no aphasia. Attention and concentration are normal.    Cranial Nerves  CN II: Visual fields full to confrontation.  CN III, IV, VI: Extraocular movements intact bilaterally. Normal saccades. Normal smooth pursuit. Normal lids and orbits bilaterally. Pupils equal round and reactive to light bilaterally.  CN V:  Right: Facial sensation is normal.  Left: Facial sensation is normal on the left.  CN VII: Full and symmetric facial movement.  CN VIII: Hearing is normal.  CN IX, X: Palate elevates symmetrically  CN XI: Shoulder shrug strength is normal.  CN XII: Tongue midline without atrophy or fasciculations.    Motor  Normal muscle bulk throughout. No fasciculations present. Normal muscle tone. No abnormal involuntary movements.                                               Right                     Left   Shoulder abduction               5                          5  Elbow flexion                         5                          5  Elbow extension                    5                          5  Finger flexion                         5                          5  Finger extension                    5                          5  Finger abduction                    5                          5  Thumb abduction                   5                          5  Hip flexion                              5                          5  Knee flexion                           5                          5  Plantarflexion                         5                      "     5  Dorsiflexion                            5                          5  LUE intermittent postural tremor, dissipates with distraction.    Sensory  Light touch is normal in upper and lower extremities. Slightly reduced pinprick over left thumb. Normal vibration in distal lower extremities.   Positive Tinel and Phalen over LUE causing paresthesia digits 1-2.    Reflexes                                            Right                      Left  Brachioradialis                    3+                         3+  Biceps                                 3+                         3+  Triceps                                3+                         3+  Patellar                                3+                         3+  Achilles                                3+                         3+  Right Plantar: downgoing  Left Plantar: downgoing    Right pathological reflexes: Anna's absent.  Left pathological reflexes: Anna's absent.    Coordination  Right: Finger-to-nose normal. Heel-to-shin normal.Left: Finger-to-nose normal. Heel-to-shin normal.    Gait  Casual gait is normal including stance, stride, and arm swing. Normal tandem gait. Romberg is absent.                          Hemoglobin A1C   Date Value Ref Range Status   09/20/2022 4.8 % Final     Comment:          Diagnosis of Diabetes-Adults   Non-Diabetic: < or = 5.6%   Increased risk for developing diabetes: 5.7-6.4%   Diagnostic of diabetes: > or = 6.5%  .       Monitoring of Diabetes                Age (y)     Therapeutic Goal (%)   Adults:          >18           <7.0   Pediatrics:    13-18           <7.5                   7-12           <8.0                   0- 6            7.5-8.5   American Diabetes Association. Diabetes Care 33(S1), Jan 2010.       Estimated Average Glucose   Date Value Ref Range Status   09/20/2022 91 MG/DL Final     TSH   Date Value Ref Range Status   09/20/2022 1.91 0.44 - 3.98 mIU/L Final     Comment:      TSH testing is  performed using different testing    methodology at [facility] than at other    [facility]. Direct result comparisons should    only be made within the same method.       Folate   Date Value Ref Range Status   09/20/2022 19.5 >5.0 ng/mL Final     Comment:     Low           <3.4  Borderline 3.4-5.0  Normal        >5.0  .   Biotin interference may cause falsely elevated results.    Patients taking a Biotin dose of up to 5 mg/day should    refrain from taking Biotin for 24 hours before sample    collection. Providers may contact their local laboratory   for further information.         CT head wo IV contrast    Result Date: 2/9/2024  Interpreted By:  Chris Milan, STUDY: CT HEAD WO IV CONTRAST;  2/9/2024 11:08 am   INDICATION: Signs/Symptoms:Paresthesia left arm.   COMPARISON:   Previous exam is from 11/25/2022..   ACCESSION NUMBER(S): [id number]   ORDERING CLINICIAN: SOLIS DEL CID   TECHNIQUE: Routine axial images were obtained from the skull base through the vertex. Sagittal and coronal reconstruction images were generated. Brain, subdural, and bone windows were reviewed.   FINDINGS: INTRACRANIAL: The ventricles, sulci and basal cisterns were within normal limits. No acute intracranial bleed, midline shift, or focal mass effect. No destructive bone lesion. No depressed skull fracture. No abnormal skull base arterial calcifications.   EXTRACRANIAL: Visualized paranasal sinuses were clear. Visualized mastoid air cells were clear.       Negative exam.   MACRO: None   Signed by: Chris Milan 2/9/2024 11:21 AM Dictation workstation:   [id number]      Assessment/Plan   Diagnoses and all orders for this visit:  Functional tremor  Left carpal tunnel syndrome    Sheila Pollock is a 23 y.o. year old female with asthma, HLD, and recent admission for psychosis, here for evaluation of an episode of left hand weakness, numbness, stuttering, and anxiety in Feb 2024. Since then she has had intermittent  episodes of left hand tremor and posturing that are suppressible. Her exam is notable for intermittent distractible postural tremor of the left hand, and positive Tinel and Phalen sign. I suspect FND and mild left CTS though her symptoms are too mild and infrequent to treat.

## 2024-04-15 ENCOUNTER — OFFICE VISIT (OUTPATIENT)
Dept: PRIMARY CARE | Facility: CLINIC | Age: 24
End: 2024-04-15
Payer: COMMERCIAL

## 2024-04-15 VITALS
TEMPERATURE: 98 F | BODY MASS INDEX: 28.06 KG/M2 | WEIGHT: 174.6 LBS | SYSTOLIC BLOOD PRESSURE: 120 MMHG | HEIGHT: 66 IN | HEART RATE: 84 BPM | DIASTOLIC BLOOD PRESSURE: 62 MMHG | OXYGEN SATURATION: 98 % | RESPIRATION RATE: 16 BRPM

## 2024-04-15 DIAGNOSIS — H44.003 INFECTION OF BOTH EYES: ICD-10-CM

## 2024-04-15 DIAGNOSIS — H57.89 REDNESS AND DISCHARGE OF EYE: ICD-10-CM

## 2024-04-15 DIAGNOSIS — H10.33 ACUTE BACTERIAL CONJUNCTIVITIS OF BOTH EYES: Primary | ICD-10-CM

## 2024-04-15 PROCEDURE — 99212 OFFICE O/P EST SF 10 MIN: CPT | Performed by: NURSE PRACTITIONER

## 2024-04-15 RX ORDER — TOBRAMYCIN 3 MG/ML
1 SOLUTION/ DROPS OPHTHALMIC EVERY 6 HOURS
Qty: 5 ML | Refills: 0 | Status: SHIPPED | OUTPATIENT
Start: 2024-04-15 | End: 2024-04-22

## 2024-04-15 ASSESSMENT — PATIENT HEALTH QUESTIONNAIRE - PHQ9
1. LITTLE INTEREST OR PLEASURE IN DOING THINGS: NOT AT ALL
SUM OF ALL RESPONSES TO PHQ9 QUESTIONS 1 AND 2: 0
2. FEELING DOWN, DEPRESSED OR HOPELESS: NOT AT ALL

## 2024-04-15 NOTE — PROGRESS NOTES
Subjective   Patient ID: Sheila Pollock is a 23 y.o. female who is with complaints of redness, irritation, and discharge on both eyes.     HPI  Patient is a 23 y.o. female who CONSULTED AT [facility] today. Patient is with complaints of redness, irritation, and discharge on both eyes. she states that the symptoms started yesterday. she denies trauma/injury to the eye, use of contact lens, nor any exposure to people with same symptoms. she states there were some yellow mucoid discharge that became crusty. she denies any eye pain nor photophobia. she denies any blurring of vision, visual floaters, double vision, nor change in visual acuity. she denies fever, nausea, vomiting, headache, nor any nasal congestion nor discharge.    Review of Systems  General: no weight loss, generally healthy, no fatigue  Head:  no headaches / sinus pain, no vertigo, no injury  Eyes: (+) redness, irritation, and discharge on both eyes, no diplopia, no pain,   Ears: no change in hearing, no tinnitus, no bleeding, no vertigo  Mouth:  no dental difficulties, no gingival bleeding, no sore throat, no loss of sense of taste  Nose: no congestion, no  discharge, no bleeding, no obstruction, no loss of sense of smell  Neck: no stiffness, no pain, no tenderness, no masses, no bruit  Pulmonary: no dyspnea, no wheezing, no hemoptysis, no cough  Cardiovascular: no chest pain, no palpitations, no syncope, no orthopnea  Gastrointestinal: no change in appetite, no dysphagia, no abdominal pains, no diarrhea, no emesis, no melena  Genito Urinary: no dysuria, no urinary urgency, no nocturia, no incontinence, no change in nature of urine  Musculoskeletal: no muscle ache, no joint pain, no limitation of range of motion, no paresthesia, no numbness  Constitutional: no fever, no chills, no night sweats    Objective   Physical Exam  General: ambulatory, in no acute distress  Head: normocephalic, no lesions  Eyes: pink palpebral  conjunctiva, anicteric sclerae, PERRLA, EOM's full, RIGHT AND LEFT EYES: (+) subconjunctival injection, (+) redness, (+) tearing, (+) yellow mucoid discharge, no photophobia  Ears: clear external auditory canals, no ear discharge, no bleeding from the ears, tympanic membrane intact  Nose: nasal mucosa normal, no nasal discharge, no bleeding, no obstruction  Throat: clear, no exudate, no lesions  Neck: supple, no masses, no bruits    Assessment/Plan   Problem List Items Addressed This Visit    None  Visit Diagnoses         Codes    Acute bacterial conjunctivitis of both eyes    -  Primary H10.33    Relevant Medications    tobramycin (Tobrex) 0.3 % ophthalmic solution    Redness and discharge of eye     H57.89    Relevant Medications    tobramycin (Tobrex) 0.3 % ophthalmic solution    Infection of both eyes     H44.003    Relevant Medications    tobramycin (Tobrex) 0.3 % ophthalmic solution        DISCHARGE SUMMARY:   Patient was seen and examined. Diagnosis, treatment, treatment options, and possible complications of today's illness discussed and explained to patient. Patient to take medication/s associated with this visit. Patient may also take OTC analgesic/antipyretic if needed for pain/fever. Advised eye protection, hand hygiene/washing, personal hygiene, and refrain from using contact lenses, eye shadows/liners, nor mascara. Advised to come back if with worsening or persistent symptoms. Patient verbalized understanding of plan of care.    Patient to come back in 7 - 10 days if needed for worsening symptoms.           CHASITY Moore-CNP 04/15/24 10:25 AM

## 2024-04-15 NOTE — PROGRESS NOTES
"Subjective   Patient ID: Sheilamarycruz Pollock is a 23 y.o. female who presents for No chief complaint on file..      Symptoms: pt is in office with pinkeye in both eyes but mostly in the left., pt states she woke up with crust eyes sealed shut a bit, yesterday they were itching a lot.  Length of symptoms: yesterday  OTC: eyedrops withy no help.  Related information:    HPI     Review of Systems    Objective   /62 Comment: auto  Pulse 84   Temp 36.7 °C (98 °F)   Resp 16   Ht 1.676 m (5' 6\")   Wt 79.2 kg (174 lb 9.6 oz)   SpO2 98%   BMI 28.18 kg/m²     Physical Exam    Assessment/Plan          "

## 2024-04-16 ASSESSMENT — ENCOUNTER SYMPTOMS
OCCASIONAL FEELINGS OF UNSTEADINESS: 0
LOSS OF SENSATION IN FEET: 0
DEPRESSION: 0

## 2024-04-18 ENCOUNTER — TELEPHONE (OUTPATIENT)
Dept: PRIMARY CARE | Facility: CLINIC | Age: 24
End: 2024-04-18
Payer: COMMERCIAL

## 2024-04-18 DIAGNOSIS — E78.5 HYPERLIPIDEMIA, UNSPECIFIED HYPERLIPIDEMIA TYPE: Primary | ICD-10-CM

## 2024-04-20 ENCOUNTER — LAB (OUTPATIENT)
Dept: LAB | Facility: LAB | Age: 24
End: 2024-04-20
Payer: COMMERCIAL

## 2024-04-20 DIAGNOSIS — Z00.00 ANNUAL PHYSICAL EXAM: ICD-10-CM

## 2024-04-20 DIAGNOSIS — E78.5 HYPERLIPIDEMIA, UNSPECIFIED HYPERLIPIDEMIA TYPE: ICD-10-CM

## 2024-04-20 DIAGNOSIS — R10.9 ABDOMINAL PAIN, UNSPECIFIED ABDOMINAL LOCATION: ICD-10-CM

## 2024-04-20 DIAGNOSIS — R93.5 ABNORMAL CT OF THE ABDOMEN: ICD-10-CM

## 2024-04-20 LAB
ALBUMIN SERPL BCP-MCNC: 4.1 G/DL (ref 3.4–5)
ALP SERPL-CCNC: 61 U/L (ref 33–110)
ALT SERPL W P-5'-P-CCNC: 11 U/L (ref 7–45)
ANION GAP SERPL CALC-SCNC: 9 MMOL/L (ref 10–20)
AST SERPL W P-5'-P-CCNC: 11 U/L (ref 9–39)
BILIRUB SERPL-MCNC: 0.4 MG/DL (ref 0–1.2)
BUN SERPL-MCNC: 7 MG/DL (ref 6–23)
CALCIUM SERPL-MCNC: 9.2 MG/DL (ref 8.6–10.3)
CHLORIDE SERPL-SCNC: 106 MMOL/L (ref 98–107)
CHOLEST SERPL-MCNC: 280 MG/DL (ref 0–199)
CHOLESTEROL/HDL RATIO: 7.1
CO2 SERPL-SCNC: 25 MMOL/L (ref 21–32)
CREAT SERPL-MCNC: 0.63 MG/DL (ref 0.5–1.05)
CRP SERPL-MCNC: 0.31 MG/DL
EGFRCR SERPLBLD CKD-EPI 2021: >90 ML/MIN/1.73M*2
ERYTHROCYTE [DISTWIDTH] IN BLOOD BY AUTOMATED COUNT: 12.9 % (ref 11.5–14.5)
ERYTHROCYTE [SEDIMENTATION RATE] IN BLOOD BY WESTERGREN METHOD: 9 MM/H (ref 0–20)
EST. AVERAGE GLUCOSE BLD GHB EST-MCNC: 88 MG/DL
GLUCOSE SERPL-MCNC: 88 MG/DL (ref 74–99)
HBA1C MFR BLD: 4.7 %
HCT VFR BLD AUTO: 37 % (ref 36–46)
HDLC SERPL-MCNC: 39.7 MG/DL
HGB BLD-MCNC: 12.2 G/DL (ref 12–16)
LDLC SERPL CALC-MCNC: 224 MG/DL
MCH RBC QN AUTO: 30.9 PG (ref 26–34)
MCHC RBC AUTO-ENTMCNC: 33 G/DL (ref 32–36)
MCV RBC AUTO: 94 FL (ref 80–100)
NON HDL CHOLESTEROL: 240 MG/DL (ref 0–149)
NRBC BLD-RTO: 0 /100 WBCS (ref 0–0)
PLATELET # BLD AUTO: 365 X10*3/UL (ref 150–450)
POTASSIUM SERPL-SCNC: 4.3 MMOL/L (ref 3.5–5.3)
PROT SERPL-MCNC: 7.2 G/DL (ref 6.4–8.2)
RBC # BLD AUTO: 3.95 X10*6/UL (ref 4–5.2)
SODIUM SERPL-SCNC: 136 MMOL/L (ref 136–145)
TRIGL SERPL-MCNC: 80 MG/DL (ref 0–149)
VLDL: 16 MG/DL (ref 0–40)
WBC # BLD AUTO: 7.3 X10*3/UL (ref 4.4–11.3)

## 2024-04-20 PROCEDURE — 85652 RBC SED RATE AUTOMATED: CPT

## 2024-04-20 PROCEDURE — 85027 COMPLETE CBC AUTOMATED: CPT

## 2024-04-20 PROCEDURE — 80061 LIPID PANEL: CPT

## 2024-04-20 PROCEDURE — 80053 COMPREHEN METABOLIC PANEL: CPT

## 2024-04-20 PROCEDURE — 36415 COLL VENOUS BLD VENIPUNCTURE: CPT

## 2024-04-20 PROCEDURE — 86140 C-REACTIVE PROTEIN: CPT

## 2024-04-20 PROCEDURE — 83036 HEMOGLOBIN GLYCOSYLATED A1C: CPT

## 2024-04-22 DIAGNOSIS — K76.9 LIVER LESION: Primary | ICD-10-CM

## 2024-04-22 RX ORDER — ROSUVASTATIN CALCIUM 10 MG/1
TABLET, COATED ORAL
Qty: 90 TABLET | Refills: 1 | Status: SHIPPED | OUTPATIENT
Start: 2024-04-22 | End: 2024-05-08 | Stop reason: SDUPTHER

## 2024-04-22 NOTE — RESULT ENCOUNTER NOTE
Spoke with patient. Repeat MRI liver in 3 months ordered, patient to schedule anytime after June 28, 2024.

## 2024-04-29 ENCOUNTER — TELEPHONE (OUTPATIENT)
Dept: PRIMARY CARE | Facility: CLINIC | Age: 24
End: 2024-04-29
Payer: COMMERCIAL

## 2024-04-29 DIAGNOSIS — F41.9 ANXIETY DISORDER, UNSPECIFIED TYPE: Primary | ICD-10-CM

## 2024-05-08 ENCOUNTER — OFFICE VISIT (OUTPATIENT)
Dept: PRIMARY CARE | Facility: CLINIC | Age: 24
End: 2024-05-08
Payer: COMMERCIAL

## 2024-05-08 VITALS
BODY MASS INDEX: 28.09 KG/M2 | RESPIRATION RATE: 16 BRPM | HEIGHT: 66 IN | DIASTOLIC BLOOD PRESSURE: 82 MMHG | OXYGEN SATURATION: 99 % | HEART RATE: 93 BPM | TEMPERATURE: 98.2 F | WEIGHT: 174.8 LBS | SYSTOLIC BLOOD PRESSURE: 120 MMHG

## 2024-05-08 DIAGNOSIS — R44.0 AUDITORY HALLUCINATIONS: Primary | ICD-10-CM

## 2024-05-08 DIAGNOSIS — E78.49 FAMILIAL HYPERLIPIDEMIA: ICD-10-CM

## 2024-05-08 PROCEDURE — 3008F BODY MASS INDEX DOCD: CPT | Performed by: PHYSICIAN ASSISTANT

## 2024-05-08 PROCEDURE — 99213 OFFICE O/P EST LOW 20 MIN: CPT | Performed by: PHYSICIAN ASSISTANT

## 2024-05-08 PROCEDURE — 1036F TOBACCO NON-USER: CPT | Performed by: PHYSICIAN ASSISTANT

## 2024-05-08 RX ORDER — ROSUVASTATIN CALCIUM 20 MG/1
TABLET, COATED ORAL
Qty: 90 TABLET | Refills: 1 | Status: SHIPPED | OUTPATIENT
Start: 2024-05-08

## 2024-05-08 RX ORDER — OLANZAPINE 5 MG/1
5 TABLET ORAL NIGHTLY
COMMUNITY
Start: 2024-04-30

## 2024-05-08 ASSESSMENT — ENCOUNTER SYMPTOMS
DECREASED CONCENTRATION: 1
DYSPHORIC MOOD: 1
HALLUCINATIONS: 1

## 2024-05-08 NOTE — ASSESSMENT & PLAN NOTE
- [facility] started pt on olanzapine 5 mg, feels less anxious with this but other sxs still there - hallucinations, feeling depressed, inattention   - She is talking with a counselor which she feels is helpful as she has h/o trauma  - I have placed referral to [facility] psychiatry team for evaluation and further tx

## 2024-05-08 NOTE — PROGRESS NOTES
"Subjective   Patient ID: Sheila Pollock is a 24 y.o. female who presents for Follow-up (Follow up on Behavioral health/Review labs/).    HPI     Two years ago had to go to psych unit, was hearing voices tellin gher to lock herself in her roomf and that her family was out to get her  - Started hearing things again - hearing heavenly music in the background, this is making it hard to concentrate, feeling depressed and crying   - Going to the [facility] - they gave her olanzapine 5 mg   - Mood is up and down, eating a lot     Review of Systems   Psychiatric/Behavioral:  Positive for decreased concentration, dysphoric mood and hallucinations.        Objective   /82 (BP Location: Right arm, Patient Position: Sitting, BP Cuff Size: Adult)   Pulse 93   Temp 36.8 °C (98.2 °F) (Temporal)   Resp 16   Ht 1.676 m (5' 6\")   Wt 79.3 kg (174 lb 12.8 oz)   SpO2 99%   BMI 28.21 kg/m²     Physical Exam  Constitutional:       Appearance: Normal appearance.   Neurological:      Mental Status: She is alert.   Psychiatric:         Attention and Perception: Attention normal.         Mood and Affect: Mood is anxious and depressed.         Speech: Speech normal.         Behavior: Behavior normal. Behavior is cooperative.         Assessment/Plan     Problem List Items Addressed This Visit       Familial hyperlipidemia    Overview     - Most recent labs: Total chol 280, , HDL 39.7, trigs 80 (4/20/24)  - Current med: rosuvastatin 10 mg          Current Assessment & Plan     - Slight improvement in labs compared to previous but still severely elevated LDL at 224  - Recommend we taper up the rosuvastatin to 20 mg and eventually will need the 40 mg dose. Pt resistant to maxing out dosage today and prefers to do it in steps which I feel is appropriate given her otherwise low cardiac risk.   - Recheck fasting lipid panel in 6 weeks          Relevant Medications    rosuvastatin (Crestor) 20 mg tablet    Other Relevant Orders "    Lipid Panel    Auditory hallucinations - Primary    Overview     - [facility] was hearing voices telling her to lock herself in her room and that her family was out to get her. Improved on antipsychotic therapy which she stopped after episode resolved.   - New episode off auditory hallucinations starting 4/2024 - hearing heavenly music in the background, making it hard to concentrate   - She is now going to [facility] but wants to get in with  psychiatry team         Current Assessment & Plan     - [facility] started pt on olanzapine 5 mg, feels less anxious with this but other sxs still there - hallucinations, feeling depressed, inattention   - She is talking with a counselor which she feels is helpful as she has h/o trauma  - I have placed referral to [facility] psychiatry team for evaluation and further tx          Relevant Orders    Referral to [facility]

## 2024-05-08 NOTE — ASSESSMENT & PLAN NOTE
- Slight improvement in labs compared to previous but still severely elevated LDL at 224  - Recommend we taper up the rosuvastatin to 20 mg and eventually will need the 40 mg dose. Pt resistant to maxing out dosage today and prefers to do it in steps which I feel is appropriate given her otherwise low cardiac risk.   - Recheck fasting lipid panel in 6 weeks

## 2024-05-09 ENCOUNTER — TELEPHONE (OUTPATIENT)
Dept: GASTROENTEROLOGY | Facility: CLINIC | Age: 24
End: 2024-05-09

## 2024-05-09 ENCOUNTER — OFFICE VISIT (OUTPATIENT)
Dept: OBSTETRICS AND GYNECOLOGY | Facility: CLINIC | Age: 24
End: 2024-05-09
Payer: COMMERCIAL

## 2024-05-09 VITALS
HEIGHT: 66 IN | DIASTOLIC BLOOD PRESSURE: 78 MMHG | WEIGHT: 175.13 LBS | BODY MASS INDEX: 28.15 KG/M2 | SYSTOLIC BLOOD PRESSURE: 106 MMHG

## 2024-05-09 DIAGNOSIS — Z01.419 WELL WOMAN EXAM WITH ROUTINE GYNECOLOGICAL EXAM: Primary | ICD-10-CM

## 2024-05-09 DIAGNOSIS — R35.0 URINARY FREQUENCY: ICD-10-CM

## 2024-05-09 PROCEDURE — 99385 PREV VISIT NEW AGE 18-39: CPT

## 2024-05-09 PROCEDURE — 3008F BODY MASS INDEX DOCD: CPT

## 2024-05-09 PROCEDURE — 87086 URINE CULTURE/COLONY COUNT: CPT

## 2024-05-09 PROCEDURE — 99213 OFFICE O/P EST LOW 20 MIN: CPT

## 2024-05-09 PROCEDURE — 1036F TOBACCO NON-USER: CPT

## 2024-05-09 ASSESSMENT — PATIENT HEALTH QUESTIONNAIRE - PHQ9
5. POOR APPETITE OR OVEREATING: NEARLY EVERY DAY
8. MOVING OR SPEAKING SO SLOWLY THAT OTHER PEOPLE COULD HAVE NOTICED. OR THE OPPOSITE, BEING SO FIGETY OR RESTLESS THAT YOU HAVE BEEN MOVING AROUND A LOT MORE THAN USUAL: NEARLY EVERY DAY
1. LITTLE INTEREST OR PLEASURE IN DOING THINGS: NEARLY EVERY DAY
6. FEELING BAD ABOUT YOURSELF - OR THAT YOU ARE A FAILURE OR HAVE LET YOURSELF OR YOUR FAMILY DOWN: MORE THAN HALF THE DAYS
SUM OF ALL RESPONSES TO PHQ QUESTIONS 1-9: 20
4. FEELING TIRED OR HAVING LITTLE ENERGY: MORE THAN HALF THE DAYS
SUM OF ALL RESPONSES TO PHQ9 QUESTIONS 1 & 2: 5
10. IF YOU CHECKED OFF ANY PROBLEMS, HOW DIFFICULT HAVE THESE PROBLEMS MADE IT FOR YOU TO DO YOUR WORK, TAKE CARE OF THINGS AT HOME, OR GET ALONG WITH OTHER PEOPLE: EXTREMELY DIFFICULT
9. THOUGHTS THAT YOU WOULD BE BETTER OFF DEAD, OR OF HURTING YOURSELF: NOT AT ALL
7. TROUBLE CONCENTRATING ON THINGS, SUCH AS READING THE NEWSPAPER OR WATCHING TELEVISION: NEARLY EVERY DAY
2. FEELING DOWN, DEPRESSED OR HOPELESS: MORE THAN HALF THE DAYS
3. TROUBLE FALLING OR STAYING ASLEEP: MORE THAN HALF THE DAYS

## 2024-05-09 ASSESSMENT — ENCOUNTER SYMPTOMS: NERVOUS/ANXIOUS: 1

## 2024-05-09 NOTE — PROGRESS NOTES
"Subjective   Sheila Pollock is a 24 y.o. female who is here for a routine exam.     GYN & Sexual Hx.:   - Last pap , normal.   - History of abnormal Pap smear: no  - Contraception: None, not currently sexually active.  - Menstrual Periods: Regular, monthly, every 26 days.    Concern for urinary frequency; states that she had a UTI in February, took antibiotics, forgot to take 1 dose, and was concerned that UTI has not gone away.  Was seen recently in an urgent care and was told it was normal, though wants to confirm.    OB Hx.:       Regular self breast exam: no  Family history of breast cancer: no    Occupation:   , Tobacco/alcohol/caffeine: no alcohol use and no tobacco use, and Illicit drugs: no history of illicit drug use    Diet: general  Exercise: irregularly    Review of Systems   Psychiatric/Behavioral:  Negative for suicidal ideas. The patient is nervous/anxious.    All other systems reviewed and are negative.    Hx of anxiety/depression, actively working with psychiatrist and PCP, no thoughts of self-harm or harming others.      Objective   /78 (BP Location: Left arm, Patient Position: Sitting)   Ht 1.676 m (5' 6\")   Wt 79.4 kg (175 lb 2 oz)   LMP 2024 (Exact Date)   BMI 28.27 kg/m²      General:   alert and oriented, in no acute distress       Heart: regular rate and rhythm   Lungs: Normal respiratory effort.   Breasts: Soft, symmetric, no skin dimpling or nipple discharge, no palpable masses or axillary nodes.   Abdomen: soft, non-tender, without masses or organomegaly   Vulva: normal, Bartholin's, Urethra, Markle's normal   Vagina: normal mucosa, normal discharge               Neuro: age-appropriate affect, behavior and speech, no gross motor deficits, normal gait     Assessment      24 y.o.  woman for annual GYN exam.     - Health Maintenance --> Routine follow up with PCP for health maintenance examination encouraged, including TSH, cholesterol, " and Vit. D evaluation.  Self breast exam encouraged; concerning characteristics of breasts reviewed - Pt. will report general concerns, any adherent lumps, skin dimpling/puckering or color changes, and any nipple discharge.  Diet and exercise reviewed.   Pap due 2026 - Reviewed ACOG and ASCCP guidelines with pt.      - STD Screening -- Declines.     - Urinary Frequency -- Urine culture done today.     - F/U 1 year or as needed.    CHASITY Dobbs-MAXIMOM

## 2024-05-09 NOTE — TELEPHONE ENCOUNTER
Patient states that she had an allergic reaction to the dye at her last MRI in March. Itchy hands, feet and legs.  Asking what she should do prior to her next MRI which is scheduled on 6/17/2024.

## 2024-05-11 LAB — BACTERIA UR CULT: NORMAL

## 2024-05-16 ENCOUNTER — APPOINTMENT (OUTPATIENT)
Dept: PRIMARY CARE | Facility: CLINIC | Age: 24
End: 2024-05-16
Payer: COMMERCIAL

## 2024-05-22 DIAGNOSIS — Z91.041 CONTRAST MEDIA ALLERGY: Primary | ICD-10-CM

## 2024-05-22 RX ORDER — PREDNISONE 10 MG/1
TABLET ORAL
Qty: 10 TABLET | Refills: 0 | Status: SHIPPED | OUTPATIENT
Start: 2024-05-22 | End: 2024-06-01

## 2024-05-30 ENCOUNTER — APPOINTMENT (OUTPATIENT)
Dept: GASTROENTEROLOGY | Facility: CLINIC | Age: 24
End: 2024-05-30
Payer: COMMERCIAL

## 2024-06-17 ENCOUNTER — HOSPITAL ENCOUNTER (OUTPATIENT)
Dept: RADIOLOGY | Facility: HOSPITAL | Age: 24
Discharge: HOME | End: 2024-06-17
Payer: COMMERCIAL

## 2024-06-17 DIAGNOSIS — K76.9 LIVER LESION: ICD-10-CM

## 2024-06-17 PROCEDURE — A9581 GADOXETATE DISODIUM INJ: HCPCS | Performed by: PHYSICIAN ASSISTANT

## 2024-06-17 PROCEDURE — 2500000004 HC RX 250 GENERAL PHARMACY W/ HCPCS (ALT 636 FOR OP/ED): Performed by: PHYSICIAN ASSISTANT

## 2024-06-17 PROCEDURE — 74183 MRI ABD W/O CNTR FLWD CNTR: CPT

## 2024-06-22 ENCOUNTER — LAB (OUTPATIENT)
Dept: LAB | Facility: LAB | Age: 24
End: 2024-06-22
Payer: COMMERCIAL

## 2024-06-22 DIAGNOSIS — E78.49 FAMILIAL HYPERLIPIDEMIA: ICD-10-CM

## 2024-06-22 LAB
CHOLEST SERPL-MCNC: 150 MG/DL (ref 0–199)
CHOLESTEROL/HDL RATIO: 4.1
HDLC SERPL-MCNC: 36.2 MG/DL
LDLC SERPL CALC-MCNC: 103 MG/DL
NON HDL CHOLESTEROL: 114 MG/DL (ref 0–149)
TRIGL SERPL-MCNC: 56 MG/DL (ref 0–149)
VLDL: 11 MG/DL (ref 0–40)

## 2024-06-22 PROCEDURE — 80061 LIPID PANEL: CPT

## 2024-06-22 PROCEDURE — 36415 COLL VENOUS BLD VENIPUNCTURE: CPT

## 2024-06-24 ENCOUNTER — APPOINTMENT (OUTPATIENT)
Dept: RADIOLOGY | Facility: HOSPITAL | Age: 24
End: 2024-06-24
Payer: COMMERCIAL

## 2024-06-27 ENCOUNTER — APPOINTMENT (OUTPATIENT)
Dept: PRIMARY CARE | Facility: CLINIC | Age: 24
End: 2024-06-27
Payer: COMMERCIAL

## 2024-06-27 ENCOUNTER — APPOINTMENT (OUTPATIENT)
Dept: RADIOLOGY | Facility: HOSPITAL | Age: 24
End: 2024-06-27
Payer: COMMERCIAL

## 2024-06-27 VITALS
RESPIRATION RATE: 18 BRPM | TEMPERATURE: 97.9 F | OXYGEN SATURATION: 99 % | SYSTOLIC BLOOD PRESSURE: 124 MMHG | WEIGHT: 182 LBS | BODY MASS INDEX: 29.25 KG/M2 | HEART RATE: 92 BPM | HEIGHT: 66 IN | DIASTOLIC BLOOD PRESSURE: 82 MMHG

## 2024-06-27 DIAGNOSIS — E78.49 FAMILIAL HYPERLIPIDEMIA: ICD-10-CM

## 2024-06-27 PROCEDURE — 3008F BODY MASS INDEX DOCD: CPT | Performed by: PHYSICIAN ASSISTANT

## 2024-06-27 PROCEDURE — 99212 OFFICE O/P EST SF 10 MIN: CPT | Performed by: PHYSICIAN ASSISTANT

## 2024-06-27 RX ORDER — OLANZAPINE 10 MG/1
10 TABLET ORAL NIGHTLY
COMMUNITY
Start: 2024-06-03

## 2024-06-27 RX ORDER — ROSUVASTATIN CALCIUM 20 MG/1
TABLET, COATED ORAL
Qty: 90 TABLET | Refills: 1 | Status: SHIPPED | OUTPATIENT
Start: 2024-06-27

## 2024-06-27 RX ORDER — LAMOTRIGINE 25 MG/1
TABLET ORAL NIGHTLY
COMMUNITY
Start: 2024-06-24

## 2024-06-27 NOTE — PROGRESS NOTES
"Subjective   Patient ID: Sheila Pollock is a 24 y.o. female who presents for Follow-up (Pt here today for a follow up and discuss recent lab. [facility] increased her Zyprexa and started her on Lamictal. ).    HPI     Follow up on recent labs  Has familial HLD and her dose of rosuvastatin was increased recently   LDL much improved now!       Review of Systems   Psychiatric/Behavioral:  Positive for dysphoric mood.        Objective   /82   Pulse 92   Temp 36.6 °C (97.9 °F)   Resp 18   Ht 1.676 m (5' 6\")   Wt 82.6 kg (182 lb)   SpO2 99%   BMI 29.38 kg/m²     Physical Exam  Constitutional:       Appearance: Normal appearance.   Cardiovascular:      Rate and Rhythm: Normal rate and regular rhythm.      Pulses: Normal pulses.      Heart sounds: Normal heart sounds. No murmur heard.  Pulmonary:      Effort: Pulmonary effort is normal.      Breath sounds: Normal breath sounds.   Neurological:      Mental Status: She is alert.   Psychiatric:         Mood and Affect: Mood and affect normal.         Assessment/Plan     Problem List Items Addressed This Visit       Familial hyperlipidemia    Overview     - Most recent labs: Total chol 150,  (down from 224 prior), HDL 36.2, trigs 56 (6/22/24)   - Current med: rosuvastatin 20 mg          Current Assessment & Plan     - Cholesterol has greatly improved!   - No concerning side effects   - Continue current tx plan          Relevant Medications    rosuvastatin (Crestor) 20 mg tablet       "

## 2024-06-30 ASSESSMENT — ENCOUNTER SYMPTOMS: DYSPHORIC MOOD: 1

## 2024-07-05 ENCOUNTER — APPOINTMENT (OUTPATIENT)
Dept: GASTROENTEROLOGY | Facility: CLINIC | Age: 24
End: 2024-07-05
Payer: COMMERCIAL

## 2024-08-12 DIAGNOSIS — K76.9 LIVER LESION: Primary | ICD-10-CM

## 2024-08-12 DIAGNOSIS — Z91.041 CONTRAST MEDIA ALLERGY: ICD-10-CM

## 2024-08-12 RX ORDER — PREDNISONE 50 MG/1
TABLET ORAL
Qty: 2 TABLET | Refills: 0 | Status: SHIPPED | OUTPATIENT
Start: 2024-08-12

## 2024-12-25 ENCOUNTER — APPOINTMENT (OUTPATIENT)
Dept: RADIOLOGY | Facility: HOSPITAL | Age: 24
End: 2024-12-25

## 2024-12-25 ENCOUNTER — HOSPITAL ENCOUNTER (EMERGENCY)
Facility: HOSPITAL | Age: 24
Discharge: HOME | End: 2024-12-25

## 2024-12-25 VITALS
OXYGEN SATURATION: 97 % | BODY MASS INDEX: 28.56 KG/M2 | DIASTOLIC BLOOD PRESSURE: 85 MMHG | HEART RATE: 79 BPM | SYSTOLIC BLOOD PRESSURE: 137 MMHG | HEIGHT: 67 IN | WEIGHT: 182 LBS | TEMPERATURE: 97.9 F | RESPIRATION RATE: 17 BRPM

## 2024-12-25 DIAGNOSIS — K11.5 SIALOLITH: Primary | ICD-10-CM

## 2024-12-25 LAB
ANION GAP SERPL CALC-SCNC: 10 MMOL/L (ref 10–20)
BASOPHILS # BLD AUTO: 0.04 X10*3/UL (ref 0–0.1)
BASOPHILS NFR BLD AUTO: 0.5 %
BUN SERPL-MCNC: 6 MG/DL (ref 6–23)
CALCIUM SERPL-MCNC: 9.2 MG/DL (ref 8.6–10.3)
CHLORIDE SERPL-SCNC: 103 MMOL/L (ref 98–107)
CO2 SERPL-SCNC: 26 MMOL/L (ref 21–32)
CREAT SERPL-MCNC: 0.62 MG/DL (ref 0.5–1.05)
CRP SERPL-MCNC: 0.39 MG/DL
EGFRCR SERPLBLD CKD-EPI 2021: >90 ML/MIN/1.73M*2
EOSINOPHIL # BLD AUTO: 0.17 X10*3/UL (ref 0–0.7)
EOSINOPHIL NFR BLD AUTO: 2.3 %
ERYTHROCYTE [DISTWIDTH] IN BLOOD BY AUTOMATED COUNT: 12.4 % (ref 11.5–14.5)
GLUCOSE SERPL-MCNC: 88 MG/DL (ref 74–99)
HCT VFR BLD AUTO: 35.1 % (ref 36–46)
HGB BLD-MCNC: 11.9 G/DL (ref 12–16)
IMM GRANULOCYTES # BLD AUTO: 0.01 X10*3/UL (ref 0–0.7)
IMM GRANULOCYTES NFR BLD AUTO: 0.1 % (ref 0–0.9)
LYMPHOCYTES # BLD AUTO: 2.13 X10*3/UL (ref 1.2–4.8)
LYMPHOCYTES NFR BLD AUTO: 28.9 %
MCH RBC QN AUTO: 30.6 PG (ref 26–34)
MCHC RBC AUTO-ENTMCNC: 33.9 G/DL (ref 32–36)
MCV RBC AUTO: 90 FL (ref 80–100)
MONOCYTES # BLD AUTO: 0.61 X10*3/UL (ref 0.1–1)
MONOCYTES NFR BLD AUTO: 8.3 %
NEUTROPHILS # BLD AUTO: 4.42 X10*3/UL (ref 1.2–7.7)
NEUTROPHILS NFR BLD AUTO: 59.9 %
NRBC BLD-RTO: 0 /100 WBCS (ref 0–0)
PLATELET # BLD AUTO: 335 X10*3/UL (ref 150–450)
POTASSIUM SERPL-SCNC: 4.3 MMOL/L (ref 3.5–5.3)
RBC # BLD AUTO: 3.89 X10*6/UL (ref 4–5.2)
SODIUM SERPL-SCNC: 135 MMOL/L (ref 136–145)
WBC # BLD AUTO: 7.4 X10*3/UL (ref 4.4–11.3)

## 2024-12-25 PROCEDURE — 70490 CT SOFT TISSUE NECK W/O DYE: CPT

## 2024-12-25 PROCEDURE — 85025 COMPLETE CBC W/AUTO DIFF WBC: CPT | Performed by: NURSE PRACTITIONER

## 2024-12-25 PROCEDURE — 70490 CT SOFT TISSUE NECK W/O DYE: CPT | Performed by: RADIOLOGY

## 2024-12-25 PROCEDURE — 86140 C-REACTIVE PROTEIN: CPT | Performed by: NURSE PRACTITIONER

## 2024-12-25 PROCEDURE — 36415 COLL VENOUS BLD VENIPUNCTURE: CPT | Performed by: NURSE PRACTITIONER

## 2024-12-25 PROCEDURE — 99284 EMERGENCY DEPT VISIT MOD MDM: CPT | Mod: 25

## 2024-12-25 PROCEDURE — 80048 BASIC METABOLIC PNL TOTAL CA: CPT | Performed by: NURSE PRACTITIONER

## 2024-12-25 RX ORDER — DEXAMETHASONE 4 MG/1
16 TABLET ORAL ONCE
Qty: 4 TABLET | Refills: 0 | Status: SHIPPED | OUTPATIENT
Start: 2024-12-25 | End: 2024-12-25

## 2024-12-25 RX ORDER — AMOXICILLIN AND CLAVULANATE POTASSIUM 875; 125 MG/1; MG/1
875 TABLET, FILM COATED ORAL 2 TIMES DAILY
Qty: 10 TABLET | Refills: 0 | Status: SHIPPED | OUTPATIENT
Start: 2024-12-25 | End: 2024-12-30

## 2024-12-25 ASSESSMENT — COLUMBIA-SUICIDE SEVERITY RATING SCALE - C-SSRS
6. HAVE YOU EVER DONE ANYTHING, STARTED TO DO ANYTHING, OR PREPARED TO DO ANYTHING TO END YOUR LIFE?: NO
1. IN THE PAST MONTH, HAVE YOU WISHED YOU WERE DEAD OR WISHED YOU COULD GO TO SLEEP AND NOT WAKE UP?: NO
2. HAVE YOU ACTUALLY HAD ANY THOUGHTS OF KILLING YOURSELF?: NO

## 2024-12-25 ASSESSMENT — LIFESTYLE VARIABLES
HAVE YOU EVER FELT YOU SHOULD CUT DOWN ON YOUR DRINKING: NO
HAVE PEOPLE ANNOYED YOU BY CRITICIZING YOUR DRINKING: NO
TOTAL SCORE: 0
EVER FELT BAD OR GUILTY ABOUT YOUR DRINKING: NO
EVER HAD A DRINK FIRST THING IN THE MORNING TO STEADY YOUR NERVES TO GET RID OF A HANGOVER: NO

## 2024-12-25 ASSESSMENT — PAIN SCALES - GENERAL: PAINLEVEL_OUTOF10: 7

## 2024-12-25 ASSESSMENT — PAIN - FUNCTIONAL ASSESSMENT: PAIN_FUNCTIONAL_ASSESSMENT: 0-10

## 2024-12-25 NOTE — ED PROVIDER NOTES
"HPI   Chief Complaint   Patient presents with    Oral Swelling     \"My glands swell and now it's affecting my tongue\", denies SOB       24-year-old female presents emergency department, states history of salivary stones per her dentist, states for the last 10 days she has had swelling consistent with a salivary stone but notes that the swelling has been getting significantly worse, now swollen under her tongue area which is concerning to her.  No fever.  Patient states she has been sucking on sour candies but does not seem to making any improvement.      History provided by:  Patient          Patient History   Past Medical History:   Diagnosis Date    Body mass index (BMI) 23.0-23.9, adult 02/10/2022    Body mass index (BMI) of 23.0 to 23.9 in adult    Body mass index (BMI) 25.0-25.9, adult 10/01/2021    Body mass index (BMI) of 25.0 to 25.9 in adult    Depression     Hyperlipidemia     Personal history of other diseases of the respiratory system 01/27/2022    History of acute pharyngitis    Personal history of other infectious and parasitic diseases 08/17/2021    History of candidiasis of vagina    Personal history of other specified conditions 02/10/2022    History of chest pain    Personal history of urinary (tract) infections 02/10/2022    History of urinary tract infection    Unspecified asthma with (acute) exacerbation ([facility]) 02/10/2022    Asthma exacerbation     Past Surgical History:   Procedure Laterality Date    CARDIAC SURGERY  02/10/2022    4 or 5 years old per patient.    MANDIBLE SURGERY  02/10/2022    in 2016 to align jaw after braces.     Family History   Problem Relation Name Age of Onset    Hyperlipidemia Mother      Cancer Maternal Grandmother          in remission.    Hyperlipidemia Maternal Grandfather      Cancer Maternal Grandfather          testicular     Social History     Tobacco Use    Smoking status: Never    Smokeless tobacco: Never   Vaping Use    Vaping status: Former   Substance " Use Topics    Alcohol use: Never    Drug use: Never       Physical Exam   ED Triage Vitals [12/25/24 1046]   Temperature Heart Rate Respirations BP   36.2 °C (97.2 °F) 89 18 158/79      Pulse Ox Temp src Heart Rate Source Patient Position   98 % -- -- --      BP Location FiO2 (%)     -- --       Physical Exam  Physical Exam:  Constitutional: Vitals noted, no distress. Afebrile.   EENT: TMs clear. Posterior oropharynx unremarkable.  Moderate swelling noted submandibular region, extends to the soft tissue under the left side of the tongue  Cardiovascular: Regular, rate, rhythm, no murmur.   Pulmonary: Lungs clear bilaterally with good aeration. No adventitious breath sounds.   Gastrointestinal: Soft, nonsurgical. Nontender. No peritoneal signs. Normoactive bowel sounds.   Musculoskeletal: No peripheral edema. Negative Homans bilaterally, no cords.   Skin: No rash.   Neuro: No focal neurologic deficits, NIH score of 0.      ED Course & MDM   Diagnoses as of 12/25/24 1248   Sialolith          Labs Reviewed   CBC WITH AUTO DIFFERENTIAL - Abnormal       Result Value    WBC 7.4      nRBC 0.0      RBC 3.89 (*)     Hemoglobin 11.9 (*)     Hematocrit 35.1 (*)     MCV 90      MCH 30.6      MCHC 33.9      RDW 12.4      Platelets 335      Neutrophils % 59.9      Immature Granulocytes %, Automated 0.1      Lymphocytes % 28.9      Monocytes % 8.3      Eosinophils % 2.3      Basophils % 0.5      Neutrophils Absolute 4.42      Immature Granulocytes Absolute, Automated 0.01      Lymphocytes Absolute 2.13      Monocytes Absolute 0.61      Eosinophils Absolute 0.17      Basophils Absolute 0.04     BASIC METABOLIC PANEL - Abnormal    Glucose 88      Sodium 135 (*)     Potassium 4.3      Chloride 103      Bicarbonate 26      Anion Gap 10      Urea Nitrogen 6      Creatinine 0.62      eGFR >90      Calcium 9.2     C-REACTIVE PROTEIN - Normal    C-Reactive Protein 0.39          CT soft tissue neck wo IV contrast   Final Result   1.  Probable sialolith along the distal left submandibular duct within   the anterior left floor of mouth. There are no appreciable   inflammatory changes of the left submandibular gland.   2. Prominent cervical lymph nodes are entirely nonspecific but could   be reactive in nature.   3. Nonspecific fullness of the nasopharyngeal soft tissues.   Correlation with direct visualization is recommended.                  MACRO:   None        Signed by: Alysia Atkinson 12/25/2024 12:03 PM   Dictation workstation:   [id number]                 No data recorded     Jim Coma Scale Score: 15 (12/25/24 1048 : Regla De La Vega RN)                   Medical Decision Making  Imaging obtained as the patient does have swelling that involves the underside of the left tongue    CBC and metabolic panels unremarkable, CRP unremarkable.    CT imaging confirms sialolith.  Discussed this with the patient, out of caution will prescribe a short course of Augmentin, Decadron as well.  Discussed follow-up with ENT, return with any worsening symptoms or additional concerns.    Procedure  Procedures     Regina Peterson, APRN-CNP  12/25/24 4108

## 2025-01-06 ENCOUNTER — APPOINTMENT (OUTPATIENT)
Dept: PRIMARY CARE | Facility: CLINIC | Age: 25
End: 2025-01-06
Payer: COMMERCIAL

## 2025-01-07 ENCOUNTER — APPOINTMENT (OUTPATIENT)
Dept: PRIMARY CARE | Facility: CLINIC | Age: 25
End: 2025-01-07
Payer: COMMERCIAL

## 2025-01-07 VITALS
BODY MASS INDEX: 29.02 KG/M2 | SYSTOLIC BLOOD PRESSURE: 132 MMHG | OXYGEN SATURATION: 96 % | HEIGHT: 67 IN | WEIGHT: 184.9 LBS | DIASTOLIC BLOOD PRESSURE: 82 MMHG | HEART RATE: 87 BPM | TEMPERATURE: 97.7 F

## 2025-01-07 DIAGNOSIS — Z23 NEED FOR VACCINATION: ICD-10-CM

## 2025-01-07 DIAGNOSIS — B37.31 YEAST VAGINITIS: ICD-10-CM

## 2025-01-07 DIAGNOSIS — R03.0 ELEVATED BP WITHOUT DIAGNOSIS OF HYPERTENSION: ICD-10-CM

## 2025-01-07 DIAGNOSIS — E78.49 FAMILIAL HYPERLIPIDEMIA: Primary | ICD-10-CM

## 2025-01-07 PROCEDURE — 99214 OFFICE O/P EST MOD 30 MIN: CPT | Performed by: PHYSICIAN ASSISTANT

## 2025-01-07 PROCEDURE — 90480 ADMN SARSCOV2 VAC 1/ONLY CMP: CPT | Performed by: PHYSICIAN ASSISTANT

## 2025-01-07 PROCEDURE — 90656 IIV3 VACC NO PRSV 0.5 ML IM: CPT | Performed by: PHYSICIAN ASSISTANT

## 2025-01-07 PROCEDURE — 3008F BODY MASS INDEX DOCD: CPT | Performed by: PHYSICIAN ASSISTANT

## 2025-01-07 PROCEDURE — 91322 SARSCOV2 VAC 50 MCG/0.5ML IM: CPT | Performed by: PHYSICIAN ASSISTANT

## 2025-01-07 PROCEDURE — 90471 IMMUNIZATION ADMIN: CPT | Performed by: PHYSICIAN ASSISTANT

## 2025-01-07 PROCEDURE — 1036F TOBACCO NON-USER: CPT | Performed by: PHYSICIAN ASSISTANT

## 2025-01-07 RX ORDER — ROSUVASTATIN CALCIUM 20 MG/1
TABLET, COATED ORAL
Qty: 90 TABLET | Refills: 1 | Status: SHIPPED | OUTPATIENT
Start: 2025-01-07

## 2025-01-07 RX ORDER — FLUCONAZOLE 150 MG/1
150 TABLET ORAL ONCE
Qty: 1 TABLET | Refills: 0 | Status: SHIPPED | OUTPATIENT
Start: 2025-01-07 | End: 2025-01-07

## 2025-01-07 ASSESSMENT — PATIENT HEALTH QUESTIONNAIRE - PHQ9
2. FEELING DOWN, DEPRESSED OR HOPELESS: NOT AT ALL
SUM OF ALL RESPONSES TO PHQ9 QUESTIONS 1 AND 2: 0
1. LITTLE INTEREST OR PLEASURE IN DOING THINGS: NOT AT ALL

## 2025-01-07 NOTE — PROGRESS NOTES
"Subjective   Patient ID: Sheila Pollock is a 24 y.o. female who presents for Follow-up.    HPI     [place] pt here today for a 6 month follow up.     Pt concerned of vaginal swelling, pain and redness noticeda lump on L labia  x 1 month ago says it lasted for about 4 days but it has resolved. \" The left labia look like it had a bug bite on it\"  Pt states she has not been sexually active for about 2 years.   She thinks she might have a yeast infection simmering because she has had some thick, cottage cheese-like discharge on several occasions.  She denies itching, burning, pain unless that 1 incident as mentioned above.    Pt also concerned of high blood pressure since [place] ER visit. She feels like she salts everything. She wants to cut back on salt.   + family hx of maternal HTN      6mo f/up:   HLD- on crestor 20 mg daily. No SE from use, so sx of unstable angina.  Cholesterol: Total chol 150,  (down from 224 prior), HDL 36.2, trigs 56 (6/22/24)   Weight: gained 2# since last visit.     Patient desires a flu and COVID vaccination today.  Patient is interested in receiving the Covid vaccination and was told that:  1.  The vaccine is experimental:  As a patient you are taking part in a trial and still can contract the disease.  2.  No guarantee of immunity: The vaccine could only lessen symptoms.  3.  You can still spread the disease: You will not be exempt from precautions mandated by the government used to help mitigate the spread of disease  4.  Vaccine damage and death: Doctors and  do not know what short and long-term damage in the vaccine can cause.  As no long-term studies have taken place, this can include permanent disability and death.    I have advised the patient to do their own research in deciding whether or not this vaccine is best for them.  I cannot give an endorsement.    Review of Systems  Constitutional: Patient denies any fever, chills, loss of appetite, or unexplained weight " "loss.  Cardiovascular: Patient denies any chest pain, shortness of breath with exertion, tachycardia, palpitations, orthopnea, or paroxysmal nocturnal dyspnea.  Respiratory: Patient denies any cough, shortness breath, or wheezing.  Gastrointestinal patient denies any nausea, vomiting, diarrhea, constipation, melena, hematochezia, or reflux symptoms  Skin: Denies any rashes or skin lesions   Neurology: Patient denies any new motor or sensory losses.  Denies any numbness, tingling, weakness, and incoordination of the extremities.  Patient also denies any tremor, seizures, or gait instability.  Endocrinology: Denies any polyuria, polydipsia, polyphagia, or heat/cold intolerance.      Objective   /82   Pulse 87   Temp 36.5 °C (97.7 °F) (Temporal)   Ht 1.702 m (5' 7\")   Wt 83.9 kg (184 lb 14.4 oz)   LMP 01/07/2025 (Exact Date)   SpO2 96%   BMI 28.96 kg/m²     Physical Exam  Gen. appearance: Alert and cooperative, in no acute distress, well-developed, well-nourished female.  Neck: Supple and without adenopathy or rigidity.  There is no JVD at 90° and no carotid bruits are noted.  There is no thyromegaly, thyroid tenderness, or palpable thyroid nodules.  Heart: Regular rate and rhythm without murmur or ectopy.  Lungs: Lungs are clear to auscultation bilaterally with good air exchange.  Skin: Good turgor, moist, warm and without rashes or lesions.  Neurological exam: Alert and oriented ×3, no tremor, normal gait.  Extremities: No clubbing, cyanosis, or edema    Assessment/Plan   Diagnoses and all orders for this visit:  Familial hyperlipidemia  -     rosuvastatin (Crestor) 20 mg tablet; Take one tablet once daily.  -     Lipid Panel; Future  -     TSH with reflex to Free T4 if abnormal; Future  Patient is tolerating Crestor 20 mg daily.  She is due to have a repeat lipid level and has asked to have a TSH ordered as well.  We will call her with those results as soon as they are reviewed.  She denies any symptoms " of unstable angina.  Cholesterol: Total chol 150,  (down from 224 prior), HDL 36.2, trigs 56 (6/22/24)     Elevated BP without diagnosis of hypertension  -     Follow Up In [facility] - PCP - Established; Future  Patient's blood pressure was elevated in the emergency room 12/25/24 and on first blood pressure reading in the office today with machine.  After recheck it had normalized.  Patient was told that she should have this rechecked in 3 months rather than 6 mo to make sure that there is no issue.    Yeast vaginitis  -     fluconazole (Diflucan) 150 mg tablet; Take 1 tablet (150 mg) by mouth 1 time for 1 dose.  Patient will try Diflucan and call if the vaginitis symptoms are not improved within the next 14 days.    Need for vaccination  -     Flu vaccine, trivalent, preservative free, age 6 months and greater (Fluarix/Fluzone/Flulaval)  -     Moderna COVID-19 vaccine, monovalent, age 12 years and older, (50mcg/0.5mL)(Spikevax)  Pt understands with verbalization that vaccines all have risks (to include: local reaction, systemic reaction). Pt has reviewed the VIS (Vaccine information sheet) and gives consent to have this vaccination despite the risks.    Patient is to return to the clinic in 3 months for reevaluation of blood pressure.    Patient understands that should they have testing outside   facilities that we may not receive the results and was told to call us if they have not heard from our office within a week after testing.    [facility] uses voice recognition technology for dictations. Sometimes the software misinterprets words. Please take this into account when reading this.

## 2025-01-25 ENCOUNTER — LAB (OUTPATIENT)
Dept: LAB | Facility: LAB | Age: 25
End: 2025-01-25
Payer: COMMERCIAL

## 2025-01-25 DIAGNOSIS — E78.49 FAMILIAL HYPERLIPIDEMIA: ICD-10-CM

## 2025-01-25 LAB
CHOLEST SERPL-MCNC: 169 MG/DL (ref 0–199)
CHOLESTEROL/HDL RATIO: 4.2
HDLC SERPL-MCNC: 39.8 MG/DL
LDLC SERPL CALC-MCNC: 118 MG/DL
NON HDL CHOLESTEROL: 129 MG/DL (ref 0–149)
TRIGL SERPL-MCNC: 58 MG/DL (ref 0–149)
TSH SERPL-ACNC: 2.28 MIU/L (ref 0.44–3.98)
VLDL: 12 MG/DL (ref 0–40)

## 2025-01-25 PROCEDURE — 84443 ASSAY THYROID STIM HORMONE: CPT

## 2025-01-25 PROCEDURE — 80061 LIPID PANEL: CPT

## 2025-01-27 ENCOUNTER — TELEPHONE (OUTPATIENT)
Dept: PRIMARY CARE | Facility: CLINIC | Age: 25
End: 2025-01-27
Payer: COMMERCIAL

## 2025-01-27 NOTE — TELEPHONE ENCOUNTER
----- Message from Carmencita Siddiqui sent at 1/27/2025 12:48 PM EST -----  Please tell patient that her labs looked normal.

## 2025-01-28 ENCOUNTER — APPOINTMENT (OUTPATIENT)
Dept: PRIMARY CARE | Facility: CLINIC | Age: 25
End: 2025-01-28
Payer: COMMERCIAL

## 2025-01-28 VITALS
TEMPERATURE: 97.3 F | HEART RATE: 86 BPM | BODY MASS INDEX: 29 KG/M2 | SYSTOLIC BLOOD PRESSURE: 130 MMHG | OXYGEN SATURATION: 98 % | RESPIRATION RATE: 18 BRPM | DIASTOLIC BLOOD PRESSURE: 82 MMHG | WEIGHT: 184.8 LBS | HEIGHT: 67 IN

## 2025-01-28 DIAGNOSIS — J30.2 SEASONAL ALLERGIC RHINITIS DUE TO FUNGAL SPORES: ICD-10-CM

## 2025-01-28 DIAGNOSIS — F41.1 GENERALIZED ANXIETY DISORDER: ICD-10-CM

## 2025-01-28 DIAGNOSIS — Z91.410 HISTORY OF SEXUAL ABUSE IN ADULTHOOD: ICD-10-CM

## 2025-01-28 DIAGNOSIS — J45.909 ASTHMA, UNSPECIFIED ASTHMA SEVERITY, UNSPECIFIED WHETHER COMPLICATED, UNSPECIFIED WHETHER PERSISTENT (HHS-HCC): ICD-10-CM

## 2025-01-28 DIAGNOSIS — R53.83 FATIGUE, UNSPECIFIED TYPE: ICD-10-CM

## 2025-01-28 DIAGNOSIS — F33.3 SEVERE EPISODE OF RECURRENT MAJOR DEPRESSIVE DISORDER, WITH PSYCHOTIC FEATURES (MULTI): ICD-10-CM

## 2025-01-28 DIAGNOSIS — R44.0 AUDITORY HALLUCINATIONS: ICD-10-CM

## 2025-01-28 DIAGNOSIS — D64.9 ANEMIA, UNSPECIFIED TYPE: ICD-10-CM

## 2025-01-28 DIAGNOSIS — Z00.00 HEALTH CARE MAINTENANCE: ICD-10-CM

## 2025-01-28 DIAGNOSIS — Z91.010 PEANUT ALLERGY: ICD-10-CM

## 2025-01-28 DIAGNOSIS — Z00.00 ANNUAL PHYSICAL EXAM: Primary | ICD-10-CM

## 2025-01-28 DIAGNOSIS — E78.49 FAMILIAL HYPERLIPIDEMIA: ICD-10-CM

## 2025-01-28 DIAGNOSIS — K14.8 TONGUE DISCOLORATION: ICD-10-CM

## 2025-01-28 DIAGNOSIS — E55.9 VITAMIN D DEFICIENCY: ICD-10-CM

## 2025-01-28 DIAGNOSIS — K76.9 LIVER LESION: ICD-10-CM

## 2025-01-28 PROBLEM — G56.02 LEFT CARPAL TUNNEL SYNDROME: Status: RESOLVED | Noted: 2024-04-12 | Resolved: 2025-01-28

## 2025-01-28 PROBLEM — Z12.4 CERVICAL CANCER SCREENING: Status: RESOLVED | Noted: 2023-11-03 | Resolved: 2025-01-28

## 2025-01-28 PROBLEM — R10.31 RIGHT LOWER QUADRANT ABDOMINAL PAIN: Status: RESOLVED | Noted: 2024-02-04 | Resolved: 2025-01-28

## 2025-01-28 PROBLEM — R25.1 FUNCTIONAL TREMOR: Status: RESOLVED | Noted: 2024-04-12 | Resolved: 2025-01-28

## 2025-01-28 PROBLEM — F29 PSYCHOSIS, UNSPECIFIED PSYCHOSIS TYPE (MULTI): Status: ACTIVE | Noted: 2025-01-28

## 2025-01-28 PROBLEM — R21 SKIN RASH: Status: RESOLVED | Noted: 2023-10-06 | Resolved: 2025-01-28

## 2025-01-28 PROCEDURE — 99213 OFFICE O/P EST LOW 20 MIN: CPT

## 2025-01-28 PROCEDURE — 99395 PREV VISIT EST AGE 18-39: CPT

## 2025-01-28 PROCEDURE — 3008F BODY MASS INDEX DOCD: CPT

## 2025-01-28 PROCEDURE — 1036F TOBACCO NON-USER: CPT

## 2025-01-28 RX ORDER — ALBUTEROL SULFATE 90 UG/1
2 INHALANT RESPIRATORY (INHALATION) EVERY 6 HOURS PRN
Qty: 17 G | Refills: 11 | Status: SHIPPED | OUTPATIENT
Start: 2025-01-28

## 2025-01-28 RX ORDER — CHOLECALCIFEROL (VITAMIN D3) 25 MCG
1000 TABLET ORAL DAILY
COMMUNITY

## 2025-01-28 RX ORDER — LAMOTRIGINE 25 MG/1
25 TABLET ORAL DAILY
Qty: 30 TABLET | Refills: 1 | Status: SHIPPED | OUTPATIENT
Start: 2025-01-28 | End: 2025-03-29

## 2025-01-28 ASSESSMENT — PATIENT HEALTH QUESTIONNAIRE - PHQ9
9. THOUGHTS THAT YOU WOULD BE BETTER OFF DEAD, OR OF HURTING YOURSELF: NOT AT ALL
1. LITTLE INTEREST OR PLEASURE IN DOING THINGS: NEARLY EVERY DAY
4. FEELING TIRED OR HAVING LITTLE ENERGY: NEARLY EVERY DAY
SUM OF ALL RESPONSES TO PHQ9 QUESTIONS 1 AND 2: 6
2. FEELING DOWN, DEPRESSED OR HOPELESS: NEARLY EVERY DAY
7. TROUBLE CONCENTRATING ON THINGS, SUCH AS READING THE NEWSPAPER OR WATCHING TELEVISION: NEARLY EVERY DAY
10. IF YOU CHECKED OFF ANY PROBLEMS, HOW DIFFICULT HAVE THESE PROBLEMS MADE IT FOR YOU TO DO YOUR WORK, TAKE CARE OF THINGS AT HOME, OR GET ALONG WITH OTHER PEOPLE: SOMEWHAT DIFFICULT
SUM OF ALL RESPONSES TO PHQ QUESTIONS 1-9: 22
8. MOVING OR SPEAKING SO SLOWLY THAT OTHER PEOPLE COULD HAVE NOTICED. OR THE OPPOSITE, BEING SO FIGETY OR RESTLESS THAT YOU HAVE BEEN MOVING AROUND A LOT MORE THAN USUAL: NEARLY EVERY DAY
5. POOR APPETITE OR OVEREATING: MORE THAN HALF THE DAYS
3. TROUBLE FALLING OR STAYING ASLEEP OR SLEEPING TOO MUCH: MORE THAN HALF THE DAYS
6. FEELING BAD ABOUT YOURSELF - OR THAT YOU ARE A FAILURE OR HAVE LET YOURSELF OR YOUR FAMILY DOWN: NEARLY EVERY DAY

## 2025-01-28 NOTE — ASSESSMENT & PLAN NOTE
For patient's anxiety, depression, and auditory/visual hallucinations will start her back on Lamictal 25 mg daily.  She is pretty sure that it helped in the past but only stopped it because she did not think she needed it anymore.  Recommended that after a month if patient does not feel it is working then she see a psychiatrist for a second opinion.  She is also open to restarting talk therapy so psychology referral was also provided.

## 2025-01-28 NOTE — ASSESSMENT & PLAN NOTE
This could be just from the depression but she also was recently diagnosed with anemia.  She does have blood work done a couple days ago so I am going to add on some additional labs for iron and TIBC, ferritin, folate, vitamin B12, vitamin D.
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
## 2025-02-05 ENCOUNTER — TELEPHONE (OUTPATIENT)
Dept: GASTROENTEROLOGY | Facility: CLINIC | Age: 25
End: 2025-02-05
Payer: COMMERCIAL

## 2025-02-05 NOTE — TELEPHONE ENCOUNTER
Patient called to schedule follow up visit with SALMA Hubbard regarding MRI.  She will call back, she needs to verify her schedule.

## 2025-02-11 ENCOUNTER — HOSPITAL ENCOUNTER (OUTPATIENT)
Dept: RADIOLOGY | Facility: HOSPITAL | Age: 25
Discharge: HOME | End: 2025-02-11
Payer: COMMERCIAL

## 2025-02-11 DIAGNOSIS — K76.9 LIVER LESION: ICD-10-CM

## 2025-02-11 PROCEDURE — A9581 GADOXETATE DISODIUM INJ: HCPCS | Performed by: NURSE PRACTITIONER

## 2025-02-11 PROCEDURE — 2500000004 HC RX 250 GENERAL PHARMACY W/ HCPCS (ALT 636 FOR OP/ED): Performed by: NURSE PRACTITIONER

## 2025-02-11 PROCEDURE — 74183 MRI ABD W/O CNTR FLWD CNTR: CPT

## 2025-02-11 RX ADMIN — GADOXETATE DISODIUM 2.1 MMOL: 181.43 INJECTION, SOLUTION INTRAVENOUS at 16:17

## 2025-02-14 LAB
25(OH)D3+25(OH)D2 SERPL-MCNC: 31 NG/ML (ref 30–100)
FERRITIN SERPL-MCNC: 22 NG/ML (ref 16–154)
FOLATE SERPL-MCNC: >24 NG/ML
IRON SATN MFR SERPL: 25 % (CALC) (ref 16–45)
IRON SERPL-MCNC: 71 MCG/DL (ref 40–190)
TIBC SERPL-MCNC: 286 MCG/DL (CALC) (ref 250–450)
VIT B12 SERPL-MCNC: 1000 PG/ML (ref 200–1100)

## 2025-02-17 ENCOUNTER — APPOINTMENT (OUTPATIENT)
Facility: HOSPITAL | Age: 25
End: 2025-02-17
Payer: COMMERCIAL

## 2025-02-18 ENCOUNTER — APPOINTMENT (OUTPATIENT)
Dept: PRIMARY CARE | Facility: CLINIC | Age: 25
End: 2025-02-18
Payer: COMMERCIAL

## 2025-02-18 ENCOUNTER — APPOINTMENT (OUTPATIENT)
Dept: GASTROENTEROLOGY | Facility: CLINIC | Age: 25
End: 2025-02-18
Payer: COMMERCIAL

## 2025-02-20 DIAGNOSIS — K76.89 LIVER CYST: ICD-10-CM

## 2025-03-10 ENCOUNTER — APPOINTMENT (OUTPATIENT)
Dept: GASTROENTEROLOGY | Facility: CLINIC | Age: 25
End: 2025-03-10
Payer: COMMERCIAL

## 2025-03-13 ENCOUNTER — OFFICE VISIT (OUTPATIENT)
Dept: URGENT CARE | Age: 25
End: 2025-03-13
Payer: COMMERCIAL

## 2025-03-13 VITALS
HEIGHT: 67 IN | SYSTOLIC BLOOD PRESSURE: 141 MMHG | TEMPERATURE: 98.8 F | DIASTOLIC BLOOD PRESSURE: 99 MMHG | BODY MASS INDEX: 29.51 KG/M2 | HEART RATE: 92 BPM | RESPIRATION RATE: 16 BRPM | OXYGEN SATURATION: 99 % | WEIGHT: 188 LBS

## 2025-03-13 DIAGNOSIS — H10.13 ALLERGIC CONJUNCTIVITIS OF BOTH EYES: Primary | ICD-10-CM

## 2025-03-13 RX ORDER — TOBRAMYCIN AND DEXAMETHASONE 3; 1 MG/ML; MG/ML
1 SUSPENSION/ DROPS OPHTHALMIC 3 TIMES DAILY
Qty: 5 ML | Refills: 0 | Status: SHIPPED | OUTPATIENT
Start: 2025-03-13 | End: 2025-03-20

## 2025-03-13 ASSESSMENT — PAIN SCALES - GENERAL: PAINLEVEL_OUTOF10: 0-NO PAIN

## 2025-03-13 NOTE — LETTER
March 13, 2025     Patient: Sheila Pollock   YOB: 2000   Date of Visit: 3/13/2025       To Whom It May Concern:    Sheila Pollock was seen in my clinic on 3/13/2025 at 8:15 am. Please excuse Sheila for her absence from work on this day to make the appointment.    If you have any questions or concerns, please don't hesitate to call.         Sincerely,         Joe Ureña PA-C        CC: No Recipients

## 2025-03-13 NOTE — PROGRESS NOTES
"Subjective   Patient ID: Sheila Pollock is a 24 y.o. female who presents for Eye Problem (Bilateral eyes discharge and itching today ).  HPI  Patient presents for bilateral eye irritation.  Patient woke up this morning with matted eyes that were slightly pruritic.  No trauma, exposure, or other known precipitating event.  Patient does have environmental allergies.  Patient had a leftover Tobrex from prior conjunctivitis and did use it once.  No other complaints    Review of Systems    Constitutional:  See HPI   Eye: See HPI  Neurologic:  Alert and oriented X4, No numbness, No tingling.    All other systems are negative     Objective     BP (!) 141/99 (BP Location: Right arm, Patient Position: Sitting)   Pulse 92   Temp 37.1 °C (98.8 °F) (Oral)   Resp 16   Ht 1.702 m (5' 7\")   Wt 85.3 kg (188 lb)   SpO2 99%   BMI 29.44 kg/m²     Physical Exam    General:  Alert and oriented, No acute distress.    Eye:  Pupils are equal, round and reactive to light, bilateral conjunctival appear slightly irritated and are not draining; minimal injection; no grossly visible abrasion or foreign body  HENT:  Normocephalic,   Neck:  Supple    Respiratory: Respirations are non-labored   Musculoskeletal: Normal ROM and strength  Integumentary:  Warm, Dry, Intact, No pallor, No rash.    Neurologic:  Alert, Oriented, Normal sensory, Cranial Nerves II-XII are grossly intact  Psychiatric:  Cooperative, Appropriate mood & affect.    Assessment/Plan   Exam is consistent with allergic conjunctivitis.  Prescription for TobraDex.  Patient's clinical presentation is otherwise unremarkable at this time. Patient is discharged with instructions to follow-up with primary care or seek emergency medical attention for worsening symptoms or any new concerns.  Problem List Items Addressed This Visit    None  Visit Diagnoses       Allergic conjunctivitis of both eyes    -  Primary    Relevant Medications    tobramycin-dexamethasone (Tobradex) " ophthalmic suspension            Final diagnoses:   [H10.13] Allergic conjunctivitis of both eyes

## 2025-04-07 ENCOUNTER — TELEPHONE (OUTPATIENT)
Dept: GASTROENTEROLOGY | Facility: CLINIC | Age: 25
End: 2025-04-07
Payer: COMMERCIAL

## 2025-04-07 NOTE — TELEPHONE ENCOUNTER
I left a message to call [phone] to reschedule 5/19 appointment with Dr. Henley.  He is not going to be in clinic that day.    Liver lesions- prefers Dr. Henley only

## 2025-04-09 ENCOUNTER — TELEPHONE (OUTPATIENT)
Dept: GASTROENTEROLOGY | Facility: CLINIC | Age: 25
End: 2025-04-09
Payer: COMMERCIAL

## 2025-04-09 NOTE — TELEPHONE ENCOUNTER
I left another message to call [phone] to reschedule 5/19 appointment with Dr. Henley.  He is not going to be in clinic that day.    Liver lesions- prefers Dr. Henley only

## 2025-04-10 DIAGNOSIS — F33.3 SEVERE EPISODE OF RECURRENT MAJOR DEPRESSIVE DISORDER, WITH PSYCHOTIC FEATURES (MULTI): ICD-10-CM

## 2025-04-10 RX ORDER — LAMOTRIGINE 25 MG/1
25 TABLET ORAL DAILY
Qty: 90 TABLET | Refills: 3 | Status: SHIPPED | OUTPATIENT
Start: 2025-04-10 | End: 2026-04-05

## 2025-04-10 NOTE — TELEPHONE ENCOUNTER
Patient called needs refill on lamoTRIgine (LaMICtal) 25 mg tablet     [facility] in [place].

## 2025-04-18 ENCOUNTER — TELEPHONE (OUTPATIENT)
Dept: GASTROENTEROLOGY | Facility: CLINIC | Age: 25
End: 2025-04-18
Payer: COMMERCIAL

## 2025-04-18 NOTE — TELEPHONE ENCOUNTER
I called patient and cancelled appointment for 4/21/25 with JENNIE Pérez, SALMA Medina said she should follow up with Dr. Henley.  She is going to call back next week to reschedule.

## 2025-04-21 ENCOUNTER — APPOINTMENT (OUTPATIENT)
Dept: GASTROENTEROLOGY | Facility: CLINIC | Age: 25
End: 2025-04-21
Payer: COMMERCIAL

## 2025-04-24 ENCOUNTER — APPOINTMENT (OUTPATIENT)
Dept: PRIMARY CARE | Facility: CLINIC | Age: 25
End: 2025-04-24
Payer: COMMERCIAL

## 2025-04-25 ENCOUNTER — OFFICE VISIT (OUTPATIENT)
Dept: PRIMARY CARE | Facility: CLINIC | Age: 25
End: 2025-04-25
Payer: COMMERCIAL

## 2025-04-25 VITALS
DIASTOLIC BLOOD PRESSURE: 91 MMHG | SYSTOLIC BLOOD PRESSURE: 141 MMHG | WEIGHT: 190.5 LBS | HEIGHT: 67 IN | BODY MASS INDEX: 29.9 KG/M2 | TEMPERATURE: 98 F | HEART RATE: 88 BPM

## 2025-04-25 DIAGNOSIS — L30.9 ECZEMA, UNSPECIFIED TYPE: ICD-10-CM

## 2025-04-25 DIAGNOSIS — R03.0 ELEVATED BLOOD PRESSURE READING: ICD-10-CM

## 2025-04-25 DIAGNOSIS — R53.83 FATIGUE, UNSPECIFIED TYPE: ICD-10-CM

## 2025-04-25 DIAGNOSIS — H10.13 ALLERGIC CONJUNCTIVITIS OF BOTH EYES: ICD-10-CM

## 2025-04-25 DIAGNOSIS — F41.1 GENERALIZED ANXIETY DISORDER: ICD-10-CM

## 2025-04-25 DIAGNOSIS — F33.3 SEVERE EPISODE OF RECURRENT MAJOR DEPRESSIVE DISORDER, WITH PSYCHOTIC FEATURES (MULTI): ICD-10-CM

## 2025-04-25 DIAGNOSIS — K11.20 SIALADENITIS: Primary | ICD-10-CM

## 2025-04-25 PROCEDURE — 3008F BODY MASS INDEX DOCD: CPT

## 2025-04-25 PROCEDURE — 99214 OFFICE O/P EST MOD 30 MIN: CPT

## 2025-04-25 RX ORDER — TRIAMCINOLONE ACETONIDE 1 MG/G
CREAM TOPICAL 2 TIMES DAILY PRN
Qty: 80 G | Refills: 1 | Status: SHIPPED | OUTPATIENT
Start: 2025-04-25

## 2025-04-25 RX ORDER — LAMOTRIGINE 25 MG/1
50 TABLET ORAL DAILY
Qty: 180 TABLET | Refills: 3 | Status: SHIPPED | OUTPATIENT
Start: 2025-04-25 | End: 2026-04-20

## 2025-04-25 RX ORDER — OLOPATADINE HYDROCHLORIDE 2 MG/ML
1 SOLUTION OPHTHALMIC DAILY
Qty: 5 ML | Refills: 2 | Status: SHIPPED | OUTPATIENT
Start: 2025-04-25 | End: 2025-05-25

## 2025-04-25 RX ORDER — AMOXICILLIN AND CLAVULANATE POTASSIUM 875; 125 MG/1; MG/1
875 TABLET, FILM COATED ORAL 2 TIMES DAILY
Qty: 14 TABLET | Refills: 0 | Status: SHIPPED | OUTPATIENT
Start: 2025-04-25 | End: 2025-05-02

## 2025-04-25 NOTE — PROGRESS NOTES
"Subjective   Sheila Pollock is a 25 y.o. female   Patient has noticed some swelling under her chin.  She has had this before and it was diagnosed as sialadenitis and she also had a sialolith.  She stopped on sour skittles and it dislodged the sialolith and she also took a course of oral antibiotics and this resolved her sialadenitis.  This feels the same as when she has had it before.  She noticed this about a month ago and it is not really changing since then.  Sometimes when she talks too much and her chin moves it can cause some discomfort.  She denies any fever, chills, nausea, vomiting, heat in the area.  She has not seen any pus or drainage.    Patient reports bilateral itchy eyes.  There is a little bit of crusting occasionally as well.  About a month ago she went to urgent care for this and they gave her an antibacterial eyedrop to treat allergic and possible bacterial conjunctivitis.  It did go away however it just came back this week.  She does report having seasonal allergies.    Her right upper arm is dry, itchy, and there is slight rash.  She believes it is likely eczema.  She has had this before.  She uses EOS vanilla Cashmere moisturizer on her body.    At our last visit I started her on Lamictal 25 mg daily for her anxiety, depression, auditory/visual hallucinations.  She does think that it is helping with the depression symptoms some.  Her tiredness levels are better.  She is also going for walks every day.  No SI, HI.  She has not yet established with a talk therapist.  She is not interested in meeting with a psychiatrist for medication management yet.    Her blood pressure is slightly elevated in the office today at 141/91.  She has never been on antihypertensive medication in the past.  She does not check her blood pressure at home.  She denies headache, vision changes, chest pain, shortness of breath.    Objective   BP (!) 141/91   Pulse 88   Temp 36.7 °C (98 °F)   Ht 1.702 m (5' 7\")   Wt " 86.4 kg (190 lb 8 oz)   BMI 29.84 kg/m²    PHYSICAL EXAM  Gen: Well appearing, in NAD  Eyes: EOMI, PERRLA.  Eyes appear normal with no scleral erythema or conjunctivitis visible.  There is no eye tearing, crusting, drainage.  HEENT: MMM. TMs normal. Throat normal.  There is some swelling in her submandibular region just under her chin.  It does feel a bit firm to the touch.  There is no warmth.  There is minimal tenderness to palpation.  Heart: RRR, no murmurs  Lungs: No increased work of breathing, CTAB, on RA  Extremities: WWP, cap refill <2sec, no pitting edema in LE b/l  Neuro: Alert, symmetrical facies, moves all extremities equally  Skin: There is a 5 inch diameter eczematous rash on the lateral side of patient's right shoulder  Psych: Appropriate mood and affect    Assessment/Plan     Problem List Items Addressed This Visit       Generalized anxiety disorder    Severe episode of recurrent major depressive disorder, with psychotic features (Multi)    Current Assessment & Plan   Will increase her Lamictal from 25 to 50 mg daily.  She is to touch base with me in 1 month to let me know how it is working.  Encouraged her again to schedule with a talk therapist.  I provided her with referrals at her last appointment which she still has.  ED precautions given regarding hallucinations         Relevant Medications    lamoTRIgine (LaMICtal) 25 mg tablet    Fatigue    Eczema    Current Assessment & Plan   Recommended taking a break from her body lotions that have fragrances and dyes in them and instead switch to fragrance and dye free options such as CeraVe or Cetaphil.  Will give her triamcinolone 0.1% cream to use twice daily as needed.         Relevant Medications    triamcinolone (Kenalog) 0.1 % cream    Allergic conjunctivitis of both eyes    Current Assessment & Plan   Will give her Pataday antihistamine eyedrops for her allergic conjunctivitis.  If she does not feel like it is helping after couple days she can  try her antibacterial eyedrops which she still has from a month ago.         Relevant Medications    olopatadine (Pataday) 0.2 % ophthalmic solution    Sialadenitis - Primary    Current Assessment & Plan   Recommended she suck on sour skittles again to help dislodge any sialolith which may be present.  Will also treat with a course of Augmentin.  Follow-up if not improving over the next week.         Relevant Medications    amoxicillin-clavulanate (Augmentin) 875-125 mg tablet    Elevated blood pressure reading    Current Assessment & Plan   Recommended patient buy an over-the-counter blood pressure cuff and check blood pressure 1-2 times a week, write these down, and follow-up with me if they are consistently 130s systolic or higher, or 90s diastolic or higher          Josey Watson D.O.  Family Medicine Physician  [facility]  [address] [place] [address] [phone]    This note has been transcribed using Dragon voice recognition system and there is a possibility of unintentional typing misprints.

## 2025-04-27 PROBLEM — F41.1 GENERALIZED ANXIETY DISORDER: Status: ACTIVE | Noted: 2023-10-18

## 2025-04-27 PROBLEM — L30.9 ECZEMA: Status: ACTIVE | Noted: 2025-04-27

## 2025-04-27 PROBLEM — K11.20 SIALADENITIS: Status: ACTIVE | Noted: 2025-04-27

## 2025-04-27 PROBLEM — R03.0 ELEVATED BLOOD PRESSURE READING: Status: ACTIVE | Noted: 2025-04-27

## 2025-04-27 PROBLEM — H10.13 ALLERGIC CONJUNCTIVITIS OF BOTH EYES: Status: ACTIVE | Noted: 2025-04-27

## 2025-04-27 NOTE — ASSESSMENT & PLAN NOTE
Recommended she suck on sour skittles again to help dislodge any sialolith which may be present.  Will also treat with a course of Augmentin.  Follow-up if not improving over the next week.

## 2025-04-27 NOTE — ASSESSMENT & PLAN NOTE
Recommended patient buy an over-the-counter blood pressure cuff and check blood pressure 1-2 times a week, write these down, and follow-up with me if they are consistently 130s systolic or higher, or 90s diastolic or higher

## 2025-04-27 NOTE — ASSESSMENT & PLAN NOTE
Will give her Pataday antihistamine eyedrops for her allergic conjunctivitis.  If she does not feel like it is helping after couple days she can try her antibacterial eyedrops which she still has from a month ago.

## 2025-04-27 NOTE — ASSESSMENT & PLAN NOTE
Recommended taking a break from her body lotions that have fragrances and dyes in them and instead switch to fragrance and dye free options such as CeraVe or Cetaphil.  Will give her triamcinolone 0.1% cream to use twice daily as needed.

## 2025-04-27 NOTE — ASSESSMENT & PLAN NOTE
Will increase her Lamictal from 25 to 50 mg daily.  She is to touch base with me in 1 month to let me know how it is working.  Encouraged her again to schedule with a talk therapist.  I provided her with referrals at her last appointment which she still has.  ED precautions given regarding hallucinations

## 2025-05-19 ENCOUNTER — APPOINTMENT (OUTPATIENT)
Dept: GASTROENTEROLOGY | Facility: CLINIC | Age: 25
End: 2025-05-19
Payer: COMMERCIAL

## 2025-08-20 ENCOUNTER — HOSPITAL ENCOUNTER (OUTPATIENT)
Dept: RADIOLOGY | Facility: HOSPITAL | Age: 25
Discharge: HOME | End: 2025-08-20
Payer: COMMERCIAL

## 2025-08-20 DIAGNOSIS — K76.89 LIVER CYST: ICD-10-CM

## 2025-08-20 PROCEDURE — 2550000001 HC RX 255 CONTRASTS: Mod: JW | Performed by: INTERNAL MEDICINE

## 2025-08-20 PROCEDURE — 74183 MRI ABD W/O CNTR FLWD CNTR: CPT

## 2025-08-20 PROCEDURE — 74183 MRI ABD W/O CNTR FLWD CNTR: CPT | Performed by: RADIOLOGY

## 2025-08-20 PROCEDURE — A9575 INJ GADOTERATE MEGLUMI 0.1ML: HCPCS | Mod: JW | Performed by: INTERNAL MEDICINE

## 2025-08-20 RX ORDER — GADOTERATE MEGLUMINE 376.9 MG/ML
0.2 INJECTION INTRAVENOUS
Status: COMPLETED | OUTPATIENT
Start: 2025-08-20 | End: 2025-08-20

## 2025-08-20 RX ADMIN — GADOTERATE MEGLUMINE 17.5 ML: 376.9 INJECTION INTRAVENOUS at 16:49

## 2025-08-24 ENCOUNTER — RESULTS FOLLOW-UP (OUTPATIENT)
Dept: GASTROENTEROLOGY | Facility: HOSPITAL | Age: 25
End: 2025-08-24
Payer: COMMERCIAL

## 2025-08-25 ENCOUNTER — APPOINTMENT (OUTPATIENT)
Dept: GASTROENTEROLOGY | Facility: CLINIC | Age: 25
End: 2025-08-25
Payer: COMMERCIAL

## 2025-09-30 ENCOUNTER — APPOINTMENT (OUTPATIENT)
Dept: OBSTETRICS AND GYNECOLOGY | Facility: CLINIC | Age: 25
End: 2025-09-30
Payer: COMMERCIAL